# Patient Record
Sex: MALE | URBAN - METROPOLITAN AREA
[De-identification: names, ages, dates, MRNs, and addresses within clinical notes are randomized per-mention and may not be internally consistent; named-entity substitution may affect disease eponyms.]

---

## 2019-10-24 ENCOUNTER — NON-PROVIDER VISIT (OUTPATIENT)
Dept: URGENT CARE | Facility: PHYSICIAN GROUP | Age: 49
End: 2019-10-24

## 2019-10-24 DIAGNOSIS — Z02.83 ENCOUNTER FOR DRUG SCREENING: ICD-10-CM

## 2019-10-24 PROCEDURE — 8907 PR URINE COLLECT ONLY: Performed by: PHYSICIAN ASSISTANT

## 2020-11-02 ENCOUNTER — TELEPHONE (OUTPATIENT)
Dept: SCHEDULING | Facility: IMAGING CENTER | Age: 50
End: 2020-11-02

## 2020-11-03 ENCOUNTER — OFFICE VISIT (OUTPATIENT)
Dept: MEDICAL GROUP | Age: 50
End: 2020-11-03
Payer: COMMERCIAL

## 2020-11-03 VITALS
HEIGHT: 68 IN | HEART RATE: 100 BPM | BODY MASS INDEX: 34.92 KG/M2 | WEIGHT: 230.4 LBS | DIASTOLIC BLOOD PRESSURE: 70 MMHG | TEMPERATURE: 97.7 F | SYSTOLIC BLOOD PRESSURE: 110 MMHG | OXYGEN SATURATION: 97 %

## 2020-11-03 DIAGNOSIS — Z95.5 H/O HEART ARTERY STENT: ICD-10-CM

## 2020-11-03 DIAGNOSIS — Z00.00 ANNUAL PHYSICAL EXAM: ICD-10-CM

## 2020-11-03 DIAGNOSIS — Z00.00 HEALTH CARE MAINTENANCE: ICD-10-CM

## 2020-11-03 DIAGNOSIS — R53.83 FATIGUE, UNSPECIFIED TYPE: ICD-10-CM

## 2020-11-03 DIAGNOSIS — E55.9 HYPOVITAMINOSIS D: ICD-10-CM

## 2020-11-03 DIAGNOSIS — E66.9 OBESITY (BMI 30-39.9): ICD-10-CM

## 2020-11-03 DIAGNOSIS — L40.50 PSORIATIC ARTHRITIS (HCC): ICD-10-CM

## 2020-11-03 DIAGNOSIS — E78.5 DYSLIPIDEMIA: ICD-10-CM

## 2020-11-03 DIAGNOSIS — E11.9 DIABETES MELLITUS TYPE 2, NONINSULIN DEPENDENT (HCC): ICD-10-CM

## 2020-11-03 DIAGNOSIS — I25.10 CORONARY ARTERY DISEASE, NON-OCCLUSIVE: ICD-10-CM

## 2020-11-03 DIAGNOSIS — Z71.85 IMMUNIZATION COUNSELING: ICD-10-CM

## 2020-11-03 DIAGNOSIS — L40.9 PSORIASIS: ICD-10-CM

## 2020-11-03 DIAGNOSIS — I10 ESSENTIAL HYPERTENSION: ICD-10-CM

## 2020-11-03 DIAGNOSIS — G47.9 SLEEP DISORDER: ICD-10-CM

## 2020-11-03 PROCEDURE — 99386 PREV VISIT NEW AGE 40-64: CPT | Performed by: INTERNAL MEDICINE

## 2020-11-03 PROCEDURE — 99214 OFFICE O/P EST MOD 30 MIN: CPT | Mod: 25 | Performed by: INTERNAL MEDICINE

## 2020-11-03 RX ORDER — SECUKINUMAB 150 MG/ML
INJECTION SUBCUTANEOUS
COMMUNITY

## 2020-11-03 RX ORDER — METOPROLOL TARTRATE 50 MG/1
50 TABLET, FILM COATED ORAL 2 TIMES DAILY
Qty: 180 TAB | Refills: 0 | Status: SHIPPED | OUTPATIENT
Start: 2020-11-03 | End: 2020-12-16 | Stop reason: SDUPTHER

## 2020-11-03 RX ORDER — METOPROLOL TARTRATE 50 MG/1
50 TABLET, FILM COATED ORAL 2 TIMES DAILY
COMMUNITY
End: 2020-11-03 | Stop reason: SDUPTHER

## 2020-11-03 RX ORDER — SIMVASTATIN 80 MG
80 TABLET ORAL NIGHTLY
COMMUNITY
End: 2020-12-16

## 2020-11-03 RX ORDER — PHENOL 1.4 %
AEROSOL, SPRAY (ML) MUCOUS MEMBRANE
COMMUNITY

## 2020-11-03 SDOH — HEALTH STABILITY: MENTAL HEALTH: HOW OFTEN DO YOU HAVE A DRINK CONTAINING ALCOHOL?: NEVER

## 2020-11-03 ASSESSMENT — ANXIETY QUESTIONNAIRES
7. FEELING AFRAID AS IF SOMETHING AWFUL MIGHT HAPPEN: NOT AT ALL
3. WORRYING TOO MUCH ABOUT DIFFERENT THINGS: NOT AT ALL
6. BECOMING EASILY ANNOYED OR IRRITABLE: NOT AT ALL
2. NOT BEING ABLE TO STOP OR CONTROL WORRYING: NOT AT ALL
1. FEELING NERVOUS, ANXIOUS, OR ON EDGE: NOT AT ALL
GAD7 TOTAL SCORE: 0
5. BEING SO RESTLESS THAT IT IS HARD TO SIT STILL: NOT AT ALL
4. TROUBLE RELAXING: NOT AT ALL

## 2020-11-03 ASSESSMENT — PATIENT HEALTH QUESTIONNAIRE - PHQ9: CLINICAL INTERPRETATION OF PHQ2 SCORE: 0

## 2020-11-03 NOTE — PROGRESS NOTES
CHIEF COMPLAINT  Chief Complaint   Patient presents with   • Establish Care   • Medication Refill     HPI  Shadi Bentley is a 50 y.o. male who presents today for the following     HCM  Recommendations/advised:  Regular exercise at least 4 days a week  Diet: advised balanced   Dental exam at least 1-2 times per year  Sunscreen use.     Immunizations:  TdaP:  UTD  Influenza: Advised  Pneumonia: Advised    DM2  D  No meds  The last A1c: 7.1 in 2020  No polydipsia, polyphagia, polyuria.  No abdominal pain, weight loss, fatigue.  Diet/exercise/BMI: As above  FH of DM: father    Hypertension, CAD, Stented coronary artery  Meds: Metoprolol, 50 mg twice daily, ASA 81 mg QD, taking as prescribed.   Denies:  -  headaches, vision problems, tinnitus.                 -  chest pain/pressure, palpitations, irregular heart beats, exertional, dyspnea, peripheral edema.      - medication side effect: unusual fatigue, slow heartbeat, foot/leg swelling, cough.  Low salt diet: Advised  Diet: Advised low-calorie advised low calorie Advised low radha  Exercise: advised min 4 d/w  BMI: Body mass index is 35.33 kg/m².  FH of HTN: father    Stress echocardiography, 2019  Terminated due to dyspnea, with predicted heart rate achieved    Dyslipidemia  The patient is on simvastatin 20 mg, taking daily, as prescribed. No myalgias, muscle cramps or pain.   Diet /Exercise/BMI:  As above  FH: father     Hypovitaminosis D, Fatigue  The patient had low vitamin D level.  C/o fatigue.  Vitamin D supplement: OTC.    Psoriasis / arthritis  Dg:  Psoriasis: 11 y/o  Arthritis: late 32, worsening   - knees, ankles, shoulders  No current pain.  Tx: Secukinumab, 300 MG Dose, (COSENTYX, 300 MG DOSE,) 150 MG/ML   F/u by dermatology.    Insomnia  The patient has have a tablet to go and stay asleep, used melatonin OTC, that helped.  Discussed sleep hygiene:   - Go to bed and wake up at consistent times whether work/school day or not.   - Keep room dark,  quiet, and comfortable.   - Don't have naps.  - Increase exposure to sunlight during awake times and avoid bright lights before going to sleep.   - Avoid stimulant or caffeine use more than 4 hours after wake time.   - Avoid meal or exercise 2-3 hours prior to going to bed.    Obesity, Body mass index is 35.33 kg/m².  Onset: since his 20'  Diet: regular  Exercise: insufficient  No temperature intolerance. No change in hair/skin quality, BMs.   No HTN, buffalo hump, purple striae, flushing.  FH of obesity: father, brother    Reviewed PMH, PSH, FH, SH, ALL, HCM/IMM  Reviewed MEDS    CURRENT MEDICATIONS  Current Outpatient Medications   Medication Sig Dispense Refill   • ASPIRIN 81 PO Take  by mouth.     • Melatonin 10 MG Tab Take  by mouth.     • simvastatin (ZOCOR) 80 MG tablet Take 80 mg by mouth every evening.     • Secukinumab, 300 MG Dose, (COSENTYX, 300 MG DOSE,) 150 MG/ML Solution Prefilled Syringe Inject  as instructed. 1 time a month     • metoprolol (LOPRESSOR) 50 MG Tab Take 1 Tab by mouth 2 times a day. 180 Tab 0     No current facility-administered medications for this visit.      ALLERGIES  Allergies: Lisinopril, Advil [ibuprofen], and Morphine  PAST MEDICAL HISTORY  Past Medical History:   Diagnosis Date   • CAD (coronary artery disease)    • Diabetes (HCC)    • Hyperlipidemia    • Psoriasis      SURGICAL HISTORY  He  has no past surgical history on file.  SOCIAL HISTORY  Social History     Tobacco Use   • Smoking status: Former Smoker     Packs/day: 1.00     Years: 30.00     Pack years: 30.00     Types: Cigarettes   • Smokeless tobacco: Never Used   • Tobacco comment: quit year and a half ago   Substance Use Topics   • Alcohol use: Not Currently     Frequency: Never   • Drug use: Never     Social History     Social History Narrative   • Not on file     FAMILY HISTORY  Family History   Problem Relation Age of Onset   • Diabetes Father    • Heart Disease Father    • Hypertension Father    •  "Hyperlipidemia Father      Family Status   Relation Name Status   • Fa  (Not Specified)     ROS   Constitutional: Negative for fever, chills, fatigue.  HENT: Negative for congestion, sore throat.  Eyes: Negative for vision problems.   Respiratory: Negative for cough, shortness of breath.  Cardiovascular: Negative for chest pain, palpitations.   Gastrointestinal: Negative for heartburn, nausea, abdominal pain.   Genitourinary: Negative for dysuria.  Musculoskeletal: Negative for significant myalgia, back and joint pain.   Skin: Negative for rash.   Neuro: Negative for dizziness, weakness and headaches.   Endo/Heme/Allergies: Does not bruise/bleed easily.   Psychiatric/Behavioral: Negative for depression.    PHYSICAL EXAM   Blood Pressure 110/70 (BP Location: Left arm, Patient Position: Sitting, BP Cuff Size: Adult)   Pulse 100   Temperature 36.5 °C (97.7 °F) (Temporal)   Height 1.72 m (5' 7.72\")   Weight 104.5 kg (230 lb 6.4 oz)   Oxygen Saturation 97%  Body mass index is 35.33 kg/m².  General:  NAD, well appearing  HEENT:   NC/AT, PERRLA, EOMI.  Cardiovascular: unlabored breathing, no peripheral cyanosis or swelling.     Lungs:   no respiratory distress.  Abdomen: non- distended.  Extremities:  No LE swelling.  Skin:  Warm, dry.  No erythema. No rash.   Neurologic: Alert & oriented x 3. CN II-XII grossly intact. No focal deficits.  Psychiatric:  Affect normal, mood normal, judgment normal.    Labs     Labs are reviewed and discussed with a patient, care everywhere    A1c 6.7, 9[birth year]28/1958  CMP, wnl: 5/10/2016  Lipid panel, wnl:  5/10/2016     Imaging     Reviewed and discussed per HPI    Assessment and Plan     Shadi Bentley is a 50 y.o. male    1. Annual physical exam  Reviewed PMH, PSH, FH, SH, ALL, MEDS, HCM/IMM.   Advised healthy habits, diet, exercise.    2. Health care maintenance  Per HPI    3. Immunization counseling  Declined flu vaccine    4. Diabetes mellitus type 2, noninsulin dependent (HCC)  Pending " labs, no meds; advise per hPI  - HEMOGLOBIN A1C; Future  - MICROALBUMIN CREAT RATIO URINE; Future  - Comp Metabolic Panel; Future    5. Essential hypertension  Controlled, continue with current treatment, cardiology f/u  - Comp Metabolic Panel; Future  6. Coronary artery disease, non-occlusive  - REFERRAL TO CARDIOLOGY  7. H/O heart artery stent*  - REFERRAL TO CARDIOLOGY    8. Dyslipidemia  Pending labs, continue current treatment  - Comp Metabolic Panel; Future  - Lipid Profile; Future    9. Hypovitaminosis D  Pending labs, continue current supplement  - VITAMIN D,25 HYDROXY; Future    10. Fatigue, unspecified type  Pending labs  - CBC WITH DIFFERENTIAL; Future  - TSH WITH REFLEX TO FT4; Future    11. Psoriasis  Stable, continue current treatment, dermatology follow-up  - REFERRAL TO DERMATOLOGY  12. Psoriatic arthritis (HCC)  - REFERRAL TO DERMATOLOGY  - CBC WITH DIFFERENTIAL; Future    13. Sleep disorder  Controlled, continue current treatment    14. Obesity (BMI 30-39.9)  - Patient identified as having weight management issue.  Appropriate orders and counseling given.    Counseling:   - Smoking:  Nonsmoker    Followup: Within 1 month, labs/DM    All questions are answered.    Please note that this dictation was created using voice recognition software, and that there might be errors of solange and possibly content.

## 2020-12-05 ENCOUNTER — HOSPITAL ENCOUNTER (OUTPATIENT)
Dept: LAB | Facility: MEDICAL CENTER | Age: 50
End: 2020-12-05
Attending: INTERNAL MEDICINE
Payer: COMMERCIAL

## 2020-12-05 DIAGNOSIS — E78.5 DYSLIPIDEMIA: ICD-10-CM

## 2020-12-05 DIAGNOSIS — I10 ESSENTIAL HYPERTENSION: ICD-10-CM

## 2020-12-05 DIAGNOSIS — E11.9 DIABETES MELLITUS TYPE 2, NONINSULIN DEPENDENT (HCC): ICD-10-CM

## 2020-12-05 DIAGNOSIS — E55.9 HYPOVITAMINOSIS D: ICD-10-CM

## 2020-12-05 DIAGNOSIS — L40.50 PSORIATIC ARTHRITIS (HCC): ICD-10-CM

## 2020-12-05 DIAGNOSIS — R53.83 FATIGUE, UNSPECIFIED TYPE: ICD-10-CM

## 2020-12-05 LAB
25(OH)D3 SERPL-MCNC: 28 NG/ML (ref 30–100)
ALBUMIN SERPL BCP-MCNC: 4.5 G/DL (ref 3.2–4.9)
ALBUMIN/GLOB SERPL: 1.6 G/DL
ALP SERPL-CCNC: 93 U/L (ref 30–99)
ALT SERPL-CCNC: 33 U/L (ref 2–50)
ANION GAP SERPL CALC-SCNC: 10 MMOL/L (ref 7–16)
AST SERPL-CCNC: 19 U/L (ref 12–45)
BASOPHILS # BLD AUTO: 0.6 % (ref 0–1.8)
BASOPHILS # BLD: 0.07 K/UL (ref 0–0.12)
BILIRUB SERPL-MCNC: 0.4 MG/DL (ref 0.1–1.5)
BUN SERPL-MCNC: 13 MG/DL (ref 8–22)
CALCIUM SERPL-MCNC: 9.6 MG/DL (ref 8.5–10.5)
CHLORIDE SERPL-SCNC: 100 MMOL/L (ref 96–112)
CHOLEST SERPL-MCNC: 166 MG/DL (ref 100–199)
CO2 SERPL-SCNC: 24 MMOL/L (ref 20–33)
CREAT SERPL-MCNC: 0.8 MG/DL (ref 0.5–1.4)
CREAT UR-MCNC: 81.42 MG/DL
EOSINOPHIL # BLD AUTO: 0.34 K/UL (ref 0–0.51)
EOSINOPHIL NFR BLD: 3.2 % (ref 0–6.9)
ERYTHROCYTE [DISTWIDTH] IN BLOOD BY AUTOMATED COUNT: 40.6 FL (ref 35.9–50)
EST. AVERAGE GLUCOSE BLD GHB EST-MCNC: 197 MG/DL
FASTING STATUS PATIENT QL REPORTED: NORMAL
GLOBULIN SER CALC-MCNC: 2.9 G/DL (ref 1.9–3.5)
GLUCOSE SERPL-MCNC: 221 MG/DL (ref 65–99)
HBA1C MFR BLD: 8.5 % (ref 0–5.6)
HCT VFR BLD AUTO: 48.1 % (ref 42–52)
HDLC SERPL-MCNC: 40 MG/DL
HGB BLD-MCNC: 16.7 G/DL (ref 14–18)
IMM GRANULOCYTES # BLD AUTO: 0.06 K/UL (ref 0–0.11)
IMM GRANULOCYTES NFR BLD AUTO: 0.6 % (ref 0–0.9)
LDLC SERPL CALC-MCNC: 98 MG/DL
LYMPHOCYTES # BLD AUTO: 2.53 K/UL (ref 1–4.8)
LYMPHOCYTES NFR BLD: 23.5 % (ref 22–41)
MCH RBC QN AUTO: 32.6 PG (ref 27–33)
MCHC RBC AUTO-ENTMCNC: 34.7 G/DL (ref 33.7–35.3)
MCV RBC AUTO: 93.9 FL (ref 81.4–97.8)
MICROALBUMIN UR-MCNC: 2.1 MG/DL
MICROALBUMIN/CREAT UR: 26 MG/G (ref 0–30)
MONOCYTES # BLD AUTO: 0.77 K/UL (ref 0–0.85)
MONOCYTES NFR BLD AUTO: 7.1 % (ref 0–13.4)
NEUTROPHILS # BLD AUTO: 7.01 K/UL (ref 1.82–7.42)
NEUTROPHILS NFR BLD: 65 % (ref 44–72)
NRBC # BLD AUTO: 0 K/UL
NRBC BLD-RTO: 0 /100 WBC
PLATELET # BLD AUTO: 239 K/UL (ref 164–446)
PMV BLD AUTO: 9.2 FL (ref 9–12.9)
POTASSIUM SERPL-SCNC: 4.5 MMOL/L (ref 3.6–5.5)
PROT SERPL-MCNC: 7.4 G/DL (ref 6–8.2)
RBC # BLD AUTO: 5.12 M/UL (ref 4.7–6.1)
SODIUM SERPL-SCNC: 134 MMOL/L (ref 135–145)
TRIGL SERPL-MCNC: 141 MG/DL (ref 0–149)
TSH SERPL DL<=0.005 MIU/L-ACNC: 1.19 UIU/ML (ref 0.38–5.33)
WBC # BLD AUTO: 10.8 K/UL (ref 4.8–10.8)

## 2020-12-05 PROCEDURE — 83036 HEMOGLOBIN GLYCOSYLATED A1C: CPT

## 2020-12-05 PROCEDURE — 80053 COMPREHEN METABOLIC PANEL: CPT

## 2020-12-05 PROCEDURE — 36415 COLL VENOUS BLD VENIPUNCTURE: CPT

## 2020-12-05 PROCEDURE — 80061 LIPID PANEL: CPT

## 2020-12-05 PROCEDURE — 82570 ASSAY OF URINE CREATININE: CPT

## 2020-12-05 PROCEDURE — 82043 UR ALBUMIN QUANTITATIVE: CPT

## 2020-12-05 PROCEDURE — 84443 ASSAY THYROID STIM HORMONE: CPT

## 2020-12-05 PROCEDURE — 85025 COMPLETE CBC W/AUTO DIFF WBC: CPT

## 2020-12-05 PROCEDURE — 82306 VITAMIN D 25 HYDROXY: CPT

## 2020-12-16 ENCOUNTER — APPOINTMENT (RX ONLY)
Dept: URBAN - METROPOLITAN AREA CLINIC 4 | Facility: CLINIC | Age: 50
Setting detail: DERMATOLOGY
End: 2020-12-16

## 2020-12-16 ENCOUNTER — OFFICE VISIT (OUTPATIENT)
Dept: MEDICAL GROUP | Age: 50
End: 2020-12-16
Payer: COMMERCIAL

## 2020-12-16 ENCOUNTER — OFFICE VISIT (OUTPATIENT)
Dept: CARDIOLOGY | Facility: MEDICAL CENTER | Age: 50
End: 2020-12-16
Payer: COMMERCIAL

## 2020-12-16 VITALS
SYSTOLIC BLOOD PRESSURE: 110 MMHG | BODY MASS INDEX: 35.77 KG/M2 | HEIGHT: 68 IN | DIASTOLIC BLOOD PRESSURE: 60 MMHG | WEIGHT: 236 LBS | OXYGEN SATURATION: 97 % | HEART RATE: 76 BPM

## 2020-12-16 VITALS
HEIGHT: 68 IN | WEIGHT: 235.4 LBS | HEART RATE: 76 BPM | DIASTOLIC BLOOD PRESSURE: 52 MMHG | OXYGEN SATURATION: 94 % | TEMPERATURE: 96.8 F | SYSTOLIC BLOOD PRESSURE: 92 MMHG | BODY MASS INDEX: 35.68 KG/M2

## 2020-12-16 DIAGNOSIS — L40.0 PSORIASIS VULGARIS: ICD-10-CM | Status: INADEQUATELY CONTROLLED

## 2020-12-16 DIAGNOSIS — I25.10 CORONARY ARTERY DISEASE INVOLVING NATIVE CORONARY ARTERY OF NATIVE HEART WITHOUT ANGINA PECTORIS: ICD-10-CM

## 2020-12-16 DIAGNOSIS — Z95.5 H/O HEART ARTERY STENT: ICD-10-CM

## 2020-12-16 DIAGNOSIS — L40.59 OTHER PSORIATIC ARTHROPATHY: ICD-10-CM

## 2020-12-16 DIAGNOSIS — I10 ESSENTIAL HYPERTENSION: ICD-10-CM

## 2020-12-16 DIAGNOSIS — E78.5 DYSLIPIDEMIA: ICD-10-CM

## 2020-12-16 DIAGNOSIS — E55.9 HYPOVITAMINOSIS D: ICD-10-CM

## 2020-12-16 DIAGNOSIS — E11.9 DIABETES MELLITUS TYPE 2, NONINSULIN DEPENDENT (HCC): ICD-10-CM

## 2020-12-16 DIAGNOSIS — I25.10 CORONARY ARTERY DISEASE, NON-OCCLUSIVE: ICD-10-CM

## 2020-12-16 PROBLEM — G47.33 OSA ON CPAP: Status: ACTIVE | Noted: 2020-12-16

## 2020-12-16 LAB — EKG IMPRESSION: NORMAL

## 2020-12-16 PROCEDURE — 99204 OFFICE O/P NEW MOD 45 MIN: CPT | Performed by: INTERNAL MEDICINE

## 2020-12-16 PROCEDURE — ? ORDER TESTS

## 2020-12-16 PROCEDURE — ? ADDITIONAL NOTES

## 2020-12-16 PROCEDURE — 93000 ELECTROCARDIOGRAM COMPLETE: CPT | Performed by: INTERNAL MEDICINE

## 2020-12-16 PROCEDURE — ? MEDICATION COUNSELING

## 2020-12-16 PROCEDURE — 99203 OFFICE O/P NEW LOW 30 MIN: CPT

## 2020-12-16 PROCEDURE — 99214 OFFICE O/P EST MOD 30 MIN: CPT | Performed by: INTERNAL MEDICINE

## 2020-12-16 PROCEDURE — ? COUNSELING

## 2020-12-16 PROCEDURE — ? PRESCRIPTION

## 2020-12-16 PROCEDURE — ? TALTZ INITIATION

## 2020-12-16 RX ORDER — HYDROCORTISONE 25 MG/G
CREAM TOPICAL
Qty: 1 | Refills: 2 | Status: ERX | COMMUNITY
Start: 2020-12-16

## 2020-12-16 RX ORDER — FLUOCINONIDE 0.5 MG/ML
SOLUTION TOPICAL
Qty: 1 | Refills: 1 | Status: ERX | COMMUNITY
Start: 2020-12-16

## 2020-12-16 RX ORDER — METFORMIN HYDROCHLORIDE 500 MG/1
1000 TABLET, EXTENDED RELEASE ORAL 2 TIMES DAILY
Qty: 360 TAB | Refills: 0 | Status: SHIPPED | OUTPATIENT
Start: 2020-12-16

## 2020-12-16 RX ORDER — METOPROLOL TARTRATE 50 MG/1
50 TABLET, FILM COATED ORAL 2 TIMES DAILY
Qty: 180 TAB | Refills: 3 | Status: SHIPPED | OUTPATIENT
Start: 2020-12-16

## 2020-12-16 RX ORDER — NITROGLYCERIN 0.4 MG/1
0.4 TABLET SUBLINGUAL PRN
Qty: 25 TAB | Refills: 3 | Status: SHIPPED | OUTPATIENT
Start: 2020-12-16

## 2020-12-16 RX ORDER — ROSUVASTATIN CALCIUM 20 MG/1
20 TABLET, COATED ORAL EVERY EVENING
Qty: 90 TAB | Refills: 3 | Status: SHIPPED | OUTPATIENT
Start: 2020-12-16

## 2020-12-16 RX ADMIN — HYDROCORTISONE: 25 CREAM TOPICAL at 00:00

## 2020-12-16 RX ADMIN — FLUOCINONIDE: 0.5 SOLUTION TOPICAL at 00:00

## 2020-12-16 ASSESSMENT — LOCATION SIMPLE DESCRIPTION DERM
LOCATION SIMPLE: RIGHT KNEE
LOCATION SIMPLE: LEFT KNEE
LOCATION SIMPLE: RIGHT EAR
LOCATION SIMPLE: LEFT HAND
LOCATION SIMPLE: RIGHT SHOULDER
LOCATION SIMPLE: LEFT CHEEK
LOCATION SIMPLE: LEFT EAR
LOCATION SIMPLE: LEFT ANKLE
LOCATION SIMPLE: RIGHT HAND
LOCATION SIMPLE: RIGHT ANKLE
LOCATION SIMPLE: LEFT SHOULDER

## 2020-12-16 ASSESSMENT — ENCOUNTER SYMPTOMS
CHILLS: 0
MYALGIAS: 0
ORTHOPNEA: 0
FEVER: 0
PND: 0
PALPITATIONS: 0
BLURRED VISION: 0
DIZZINESS: 0
LOSS OF CONSCIOUSNESS: 0
SHORTNESS OF BREATH: 0
INSOMNIA: 0
ABDOMINAL PAIN: 0

## 2020-12-16 ASSESSMENT — LOCATION DETAILED DESCRIPTION DERM
LOCATION DETAILED: LEFT RADIAL PALM
LOCATION DETAILED: RIGHT SHOULDER JOINT
LOCATION DETAILED: LEFT CENTRAL MALAR CHEEK
LOCATION DETAILED: LEFT ANKLE JOINT
LOCATION DETAILED: LEFT KNEE JOINT
LOCATION DETAILED: LEFT SHOULDER JOINT
LOCATION DETAILED: RIGHT ULNAR PALM
LOCATION DETAILED: RIGHT KNEE JOINT
LOCATION DETAILED: RIGHT ANKLE JOINT
LOCATION DETAILED: RIGHT CRUS OF HELIX
LOCATION DETAILED: LEFT CRUS OF HELIX

## 2020-12-16 ASSESSMENT — LOCATION ZONE DERM
LOCATION ZONE: KNEE
LOCATION ZONE: EAR
LOCATION ZONE: ANKLE
LOCATION ZONE: SHOULDER
LOCATION ZONE: FACE
LOCATION ZONE: HAND

## 2020-12-16 ASSESSMENT — FIBROSIS 4 INDEX
FIB4 SCORE: 0.66
FIB4 SCORE: 0.66

## 2020-12-16 ASSESSMENT — BSA PSORIASIS: % BODY COVERED IN PSORIASIS: 3

## 2020-12-16 NOTE — PROCEDURE: ADDITIONAL NOTES
Additional Notes: I assessed this patient with Dr. Дмитрий Mendez, who gave his recommendation to add topical medications to patient’s existing regimen vs switching biologics. The patient elects to try a new biologic, which, in this case, will be Taltz. Dr. Becerra will follow up with this pt and manage their care accordingly from this point forward.
Detail Level: Detailed

## 2020-12-16 NOTE — PROGRESS NOTES
CHIEF COMPLAINT  Chief Complaint   Patient presents with   • Lab Results   DM    HPI  Shadi Bentley is a 50 y.o. male who presents today for the following     DM2  Interval course  HBA1C 8.5 (H) 2020 07:07 AM   HBA1C 7.1 (A) 2020 10:39 AM   HBA1C 12.0 (A) 2019 01:50 PM     No meds.    D  No meds  The last A1c: 7.1 in 2020  No polydipsia, polyphagia, polyuria.  No abdominal pain, weight loss, fatigue.  Diet/exercise/BMI: As above  FH of DM: father     Hypertension, CAD, Stented coronary artery  Meds: Metoprolol, 50 mg twice daily, ASA 81 mg QD, taking as prescribed.   Denies:  -  headaches, vision problems, tinnitus.                 -  chest pain/pressure, palpitations, irregular heart beats, exertional, dyspnea, peripheral edema.                 - medication side effect: unusual fatigue, slow heartbeat, foot/leg swelling, cough.  Low salt diet: Advised  Diet: Advised low-calorie advised low calorie Advised low radha  Exercise: advised min 4 d/w  BMI: Body mass index is 35  FH of HTN: father     Stress echocardiography, 2019  Terminated due to dyspnea, with predicted heart rate achieved     Dyslipidemia  The patient is on simvastatin 20 mg, taking daily, as prescribed. No myalgias, muscle cramps or pain.   Diet /Exercise/BMI:  As above  FH: father      Hypovitaminosis D  The patient had low vitamin D level.  Vitamin D supplement: no, will start 2000 U QD OTC.    Reviewed PMH, PSH, FH, SH, ALL, HCM/IMM, no changes  Reviewed MEDS, no changes    Patient Active Problem List    Diagnosis Date Noted   • VENESSA on CPAP 2020   • Diabetes mellitus type 2, noninsulin dependent (HCC) 2020   • Essential hypertension 2020   • Coronary artery disease, non-occlusive 2020   • H/O heart artery stent 2020   • Dyslipidemia 2020   • Psoriasis 2020   • Psoriatic arthritis (HCC) 2020   • Sleep disorder 2020   • Obesity (BMI 30-39.9) 2020     CURRENT  MEDICATIONS  Current Outpatient Medications   Medication Sig Dispense Refill   • rosuvastatin (CRESTOR) 20 MG Tab Take 1 Tab by mouth every evening. 90 Tab 3   • metoprolol (LOPRESSOR) 50 MG Tab Take 1 Tab by mouth 2 times a day. 180 Tab 3   • nitroglycerin (NITROSTAT) 0.4 MG SL Tab Place 1 Tab under the tongue as needed for Chest Pain. 25 Tab 3   • ASPIRIN 81 PO Take  by mouth.     • Melatonin 10 MG Tab Take  by mouth.     • Secukinumab, 300 MG Dose, (COSENTYX, 300 MG DOSE,) 150 MG/ML Solution Prefilled Syringe Inject  as instructed. 1 time a month       No current facility-administered medications for this visit.      ALLERGIES  Allergies: Lisinopril, Advil [ibuprofen], and Morphine  PAST MEDICAL HISTORY  Past Medical History:   Diagnosis Date   • CAD (coronary artery disease)    • Diabetes (HCC)    • Hyperlipidemia    • Psoriasis      SURGICAL HISTORY  He  has no past surgical history on file.  SOCIAL HISTORY  Social History     Tobacco Use   • Smoking status: Former Smoker     Packs/day: 1.00     Years: 30.00     Pack years: 30.00     Types: Cigarettes   • Smokeless tobacco: Never Used   • Tobacco comment: quit year and a half ago   Substance Use Topics   • Alcohol use: Not Currently     Frequency: Never   • Drug use: Never     Social History     Social History Narrative   • Not on file     FAMILY HISTORY  Family History   Problem Relation Age of Onset   • Diabetes Father    • Heart Disease Father    • Hypertension Father    • Hyperlipidemia Father      Family Status   Relation Name Status   • Fa  (Not Specified)     ROS   Constitutional: Negative for fever, chills, fatigue.  HENT: Negative for congestion, sore throat.  Eyes: Negative for vision problems.   Respiratory: Negative for cough, shortness of breath.  Cardiovascular: Negative for chest pain, palpitations.   Gastrointestinal: Negative for heartburn, nausea, abdominal pain.   Genitourinary: Negative for dysuria.  Musculoskeletal: Negative for significant  "myalgia, back and joint pain.   Skin: Negative for rash.   Neuro: Negative for dizziness, weakness and headaches.   Endo/Heme/Allergies: Does not bruise/bleed easily.   Psychiatric/Behavioral: Negative for depression.    PHYSICAL EXAM   Blood Pressure (Abnormal) 92/52 (BP Location: Left arm, Patient Position: Sitting, BP Cuff Size: Adult)   Pulse 76   Temperature 36 °C (96.8 °F) (Temporal)   Height 1.727 m (5' 8\")   Weight 106.8 kg (235 lb 6.4 oz)   Oxygen Saturation 94%  Body mass index is 35.79 kg/m².  General:  NAD, well appearing  HEENT:   NC/AT, PERRLA, EOMI.  Cardiovascular: unlabored breathing, no peripheral cyanosis or swelling.     Lungs:   no respiratory distress.  Abdomen: non- distended.  Extremities:  No LE swelling.  Skin:  Warm, dry.  No erythema. No rash.   Neurologic: Alert & oriented x 3. CN II-XII grossly intact. No focal deficits.  Psychiatric:  Affect normal, mood normal, judgment normal.    Labs     Labs are reviewed and discussed with a patient  Lab Results   Component Value Date/Time    CHOLSTRLTOT 166 12/05/2020 07:07 AM    LDL 98 12/05/2020 07:07 AM    HDL 40 12/05/2020 07:07 AM    TRIGLYCERIDE 141 12/05/2020 07:07 AM       Lab Results   Component Value Date/Time    SODIUM 134 (L) 12/05/2020 07:07 AM    POTASSIUM 4.5 12/05/2020 07:07 AM    CHLORIDE 100 12/05/2020 07:07 AM    CO2 24 12/05/2020 07:07 AM    GLUCOSE 221 (H) 12/05/2020 07:07 AM    BUN 13 12/05/2020 07:07 AM    CREATININE 0.80 12/05/2020 07:07 AM     Lab Results   Component Value Date/Time    ALKPHOSPHAT 93 12/05/2020 07:07 AM    ASTSGOT 19 12/05/2020 07:07 AM    ALTSGPT 33 12/05/2020 07:07 AM    TBILIRUBIN 0.4 12/05/2020 07:07 AM      Lab Results   Component Value Date/Time    HBA1C 8.5 (H) 12/05/2020 07:07 AM    HBA1C 7.1 (A) 06/22/2020 10:39 AM    HBA1C 12.0 (A) 11/30/2019 01:50 PM     Lab Results   Component Value Date/Time    WBC 10.8 12/05/2020 07:07 AM    RBC 5.12 12/05/2020 07:07 AM    HEMOGLOBIN 16.7 12/05/2020 " 07:07 AM    HEMATOCRIT 48.1 12/05/2020 07:07 AM    MCV 93.9 12/05/2020 07:07 AM    MCH 32.6 12/05/2020 07:07 AM    MCHC 34.7 12/05/2020 07:07 AM    MPV 9.2 12/05/2020 07:07 AM    NEUTSPOLYS 65.00 12/05/2020 07:07 AM    LYMPHOCYTES 23.50 12/05/2020 07:07 AM    MONOCYTES 7.10 12/05/2020 07:07 AM    EOSINOPHILS 3.20 12/05/2020 07:07 AM    BASOPHILS 0.60 12/05/2020 07:07 AM      Imaging     Reviewed and discussed per HPI    Assessment and Plan     Shadi Bentley is a 50 y.o. male    1. Diabetes mellitus type 2, noninsulin dependent (HCC)  Advised:  -Low-calorie diet, daily exercise, weight loss  -Daily inspection  -Start Metformin 1 tablet twice daily for 1 week and then 2 tablets twice daily  - metFORMIN ER (GLUCOPHAGE XR) 500 MG TABLET SR 24 HR; Take 2 Tabs by mouth 2 times a day.  Dispense: 360 Tab; Refill: 0  - Comp Metabolic Panel; Future  - HEMOGLOBIN A1C; Future  - REFERRAL TO OPHTHALMOLOGY    2. Essential hypertension  Controlled, continue with current treatment, cardiology follow-up  - Comp Metabolic Panel; Future  - REFERRAL TO OPHTHALMOLOGY  3. Coronary artery disease, non-occlusive  4. H/O heart artery stent  As above    5. Dyslipidemia  Controlled, continue with current treatment.  - Comp Metabolic Panel; Future    6. Hypovitaminosis D  Advised to start 2000 units of vitamin D daily, OTC  - VITAMIN D,25 HYDROXY; Future    Counseling:   - Smoking:  Nonsmoker    Followup: In 3 months. DM RN    All questions are answered.    Please note that this dictation was created using voice recognition software, and that there might be errors of solange and possibly content.

## 2020-12-16 NOTE — PROCEDURE: ORDER TESTS
Billing Type: Third-Party Bill
Expected Date Of Service: 12/16/2020
Performing Laboratory: [id number]
Bill For Surgical Tray: no

## 2020-12-16 NOTE — PROCEDURE: MEDICATION COUNSELING
Cosentyx Pregnancy And Lactation Text: This medication is Pregnancy Category B and is considered safe during pregnancy. It is unknown if this medication is excreted in breast milk.
Simponi Pregnancy And Lactation Text: The risk during pregnancy and breastfeeding is uncertain with this medication.
Nsaids Pregnancy And Lactation Text: These medications are considered safe up to 30 weeks gestation. It is excreted in breast milk.
Fluconazole Counseling:  Patient counseled regarding adverse effects of fluconazole including but not limited to headache, diarrhea, nausea, upset stomach, liver function test abnormalities, taste disturbance, and stomach pain.  There is a rare possibility of liver failure that can occur when taking fluconazole.  The patient understands that monitoring of LFTs and kidney function test may be required, especially at baseline. The patient verbalized understanding of the proper use and possible adverse effects of fluconazole.  All of the patient's questions and concerns were addressed.
Itraconazole Counseling:  I discussed with the patient the risks of itraconazole including but not limited to liver damage, nausea/vomiting, neuropathy, and severe allergy.  The patient understands that this medication is best absorbed when taken with acidic beverages such as non-diet cola or ginger ale.  The patient understands that monitoring is required including baseline LFTs and repeat LFTs at intervals.  The patient understands that they are to contact us or the primary physician if concerning signs are noted.
Mirvaso Counseling: Mirvaso is a topical medication which can decrease superficial blood flow where applied. Side effects are uncommon and include stinging, redness and allergic reactions.
Topical Sulfur Applications Counseling: Topical Sulfur Counseling: Patient counseled that this medication may cause skin irritation or allergic reactions.  In the event of skin irritation, the patient was advised to reduce the amount of the drug applied or use it less frequently.   The patient verbalized understanding of the proper use and possible adverse effects of topical sulfur application.  All of the patient's questions and concerns were addressed.
Acitretin Pregnancy And Lactation Text: This medication is Pregnancy Category X and should not be given to women who are pregnant or may become pregnant in the future. This medication is excreted in breast milk.
Acitretin Counseling:  I discussed with the patient the risks of acitretin including but not limited to hair loss, dry lips/skin/eyes, liver damage, hyperlipidemia, depression/suicidal ideation, photosensitivity.  Serious rare side effects can include but are not limited to pancreatitis, pseudotumor cerebri, bony changes, clot formation/stroke/heart attack.  Patient understands that alcohol is contraindicated since it can result in liver toxicity and significantly prolong the elimination of the drug by many years.
Calcipotriene Counseling:  I discussed with the patient the risks of calcipotriene including but not limited to erythema, scaling, itching, and irritation.
Erythromycin Counseling:  I discussed with the patient the risks of erythromycin including but not limited to GI upset, allergic reaction, drug rash, diarrhea, increase in liver enzymes, and yeast infections.
Ivermectin Counseling:  Patient instructed to take medication on an empty stomach with a full glass of water.  Patient informed of potential adverse effects including but not limited to nausea, diarrhea, dizziness, itching, and swelling of the extremities or lymph nodes.  The patient verbalized understanding of the proper use and possible adverse effects of ivermectin.  All of the patient's questions and concerns were addressed.
Thalidomide Counseling: I discussed with the patient the risks of thalidomide including but not limited to birth defects, anxiety, weakness, chest pain, dizziness, cough and severe allergy.
Topical Sulfur Applications Pregnancy And Lactation Text: This medication is Pregnancy Category C and has an unknown safety profile during pregnancy. It is unknown if this topical medication is excreted in breast milk.
Skyrizi Counseling: I discussed with the patient the risks of risankizumab-rzaa including but not limited to immunosuppression, and serious infections.  The patient understands that monitoring is required including a PPD at baseline and must alert us or the primary physician if symptoms of infection or other concerning signs are noted.
Mirvaso Pregnancy And Lactation Text: This medication has not been assigned a Pregnancy Risk Category. It is unknown if the medication is excreted in breast milk.
Odomzo Counseling- I discussed with the patient the risks of Odomzo including but not limited to nausea, vomiting, diarrhea, constipation, weight loss, changes in the sense of taste, decreased appetite, muscle spasms, and hair loss.  The patient verbalized understanding of the proper use and possible adverse effects of Odomzo.  All of the patient's questions and concerns were addressed.
Dapsone Pregnancy And Lactation Text: This medication is Pregnancy Category C and is not considered safe during pregnancy or breast feeding.
Dapsone Counseling: I discussed with the patient the risks of dapsone including but not limited to hemolytic anemia, agranulocytosis, rashes, methemoglobinemia, kidney failure, peripheral neuropathy, headaches, GI upset, and liver toxicity.  Patients who start dapsone require monitoring including baseline LFTs and weekly CBCs for the first month, then every month thereafter.  The patient verbalized understanding of the proper use and possible adverse effects of dapsone.  All of the patient's questions and concerns were addressed.
Thalidomide Pregnancy And Lactation Text: This medication is Pregnancy Category X and is absolutely contraindicated during pregnancy. It is unknown if it is excreted in breast milk.
Dupixent Counseling: I discussed with the patient the risks of dupilumab including but not limited to eye infection and irritation, cold sores, injection site reactions, worsening of asthma, allergic reactions and increased risk of parasitic infection.  Live vaccines should be avoided while taking dupilumab. Dupilumab will also interact with certain medications such as warfarin and cyclosporine. The patient understands that monitoring is required and they must alert us or the primary physician if symptoms of infection or other concerning signs are noted.
Calcipotriene Pregnancy And Lactation Text: This medication has not been proven safe during pregnancy. It is unknown if this medication is excreted in breast milk.
Fluconazole Pregnancy And Lactation Text: This medication is Pregnancy Category C and it isn't know if it is safe during pregnancy. It is also excreted in breast milk.
Ivermectin Pregnancy And Lactation Text: This medication is Pregnancy Category C and it isn't known if it is safe during pregnancy. It is also excreted in breast milk.
Erythromycin Pregnancy And Lactation Text: This medication is Pregnancy Category B and is considered safe during pregnancy. It is also excreted in breast milk.
Azathioprine Counseling:  I discussed with the patient the risks of azathioprine including but not limited to myelosuppression, immunosuppression, hepatotoxicity, lymphoma, and infections.  The patient understands that monitoring is required including baseline LFTs, Creatinine, possible TPMP genotyping and weekly CBCs for the first month and then every 2 weeks thereafter.  The patient verbalized understanding of the proper use and possible adverse effects of azathioprine.  All of the patient's questions and concerns were addressed.
Griseofulvin Counseling:  I discussed with the patient the risks of griseofulvin including but not limited to photosensitivity, cytopenia, liver damage, nausea/vomiting and severe allergy.  The patient understands that this medication is best absorbed when taken with a fatty meal (e.g., ice cream or french fries).
Dupixent Pregnancy And Lactation Text: This medication likely crosses the placenta but the risk for the fetus is uncertain. This medication is excreted in breast milk.
Metronidazole Counseling:  I discussed with the patient the risks of metronidazole including but not limited to seizures, nausea/vomiting, a metallic taste in the mouth, nausea/vomiting and severe allergy.
Ketoconazole Counseling:   Patient counseled regarding improving absorption with orange juice.  Adverse effects include but are not limited to breast enlargement, headache, diarrhea, nausea, upset stomach, liver function test abnormalities, taste disturbance, and stomach pain.  There is a rare possibility of liver failure that can occur when taking ketoconazole. The patient understands that monitoring of LFTs may be required, especially at baseline. The patient verbalized understanding of the proper use and possible adverse effects of ketoconazole.  All of the patient's questions and concerns were addressed.
Picato Counseling:  I discussed with the patient the risks of Picato including but not limited to erythema, scaling, itching, weeping, crusting, and pain.
Wartpeel Counseling:  I discussed with the patient the risks of Wartpeel including but not limited to erythema, scaling, itching, weeping, crusting, and pain.
Bexarotene Pregnancy And Lactation Text: This medication is Pregnancy Category X and should not be given to women who are pregnant or may become pregnant. This medication should not be used if you are breast feeding.
5-Fu Counseling: 5-Fluorouracil Counseling:  I discussed with the patient the risks of 5-fluorouracil including but not limited to erythema, scaling, itching, weeping, crusting, and pain.
Bexarotene Counseling:  I discussed with the patient the risks of bexarotene including but not limited to hair loss, dry lips/skin/eyes, liver abnormalities, hyperlipidemia, pancreatitis, depression/suicidal ideation, photosensitivity, drug rash/allergic reactions, hypothyroidism, anemia, leukopenia, infection, cataracts, and teratogenicity.  Patient understands that they will need regular blood tests to check lipid profile, liver function tests, white blood cell count, thyroid function tests and pregnancy test if applicable.
Detail Level: Generalized
Tranexamic Acid Counseling:  Patient advised of the small risk of bleeding problems with tranexamic acid. They were also instructed to call if they developed any nausea, vomiting or diarrhea. All of the patient's questions and concerns were addressed.
Metronidazole Pregnancy And Lactation Text: This medication is Pregnancy Category B and considered safe during pregnancy.  It is also excreted in breast milk.
Ketoconazole Pregnancy And Lactation Text: This medication is Pregnancy Category C and it isn't know if it is safe during pregnancy. It is also excreted in breast milk and breast feeding isn't recommended.
Picato Pregnancy And Lactation Text: This medication is Pregnancy Category C. It is unknown if this medication is excreted in breast milk.
Wartpeel Pregnancy And Lactation Text: This medication is Pregnancy Category X and contraindicated in pregnancy and in women who may become pregnant. It is unknown if this medication is excreted in breast milk.
Azathioprine Pregnancy And Lactation Text: This medication is Pregnancy Category D and isn't considered safe during pregnancy. It is unknown if this medication is excreted in breast milk.
Otezla Counseling: The side effects of Otezla were discussed with the patient, including but not limited to worsening or new depression, weight loss, diarrhea, nausea, upper respiratory tract infection, and headache. Patient instructed to call the office should any adverse effect occur.  The patient verbalized understanding of the proper use and possible adverse effects of Otezla.  All the patient's questions and concerns were addressed.
Tranexamic Acid Pregnancy And Lactation Text: It is unknown if this medication is safe during pregnancy or breast feeding.
Erivedge Counseling- I discussed with the patient the risks of Erivedge including but not limited to nausea, vomiting, diarrhea, constipation, weight loss, changes in the sense of taste, decreased appetite, muscle spasms, and hair loss.  The patient verbalized understanding of the proper use and possible adverse effects of Erivedge.  All of the patient's questions and concerns were addressed.
Enbrel Counseling:  I discussed with the patient the risks of etanercept including but not limited to myelosuppression, immunosuppression, autoimmune hepatitis, demyelinating diseases, lymphoma, and infections.  The patient understands that monitoring is required including a PPD at baseline and must alert us or the primary physician if symptoms of infection or other concerning signs are noted.
Griseofulvin Pregnancy And Lactation Text: This medication is Pregnancy Category X and is known to cause serious birth defects. It is unknown if this medication is excreted in breast milk but breast feeding should be avoided.
Stelara Counseling:  I discussed with the patient the risks of ustekinumab including but not limited to immunosuppression, malignancy, posterior leukoencephalopathy syndrome, and serious infections.  The patient understands that monitoring is required including a PPD at baseline and must alert us or the primary physician if symptoms of infection or other concerning signs are noted.
Drysol Counseling:  I discussed with the patient the risks of drysol/aluminum chloride including but not limited to skin rash, itching, irritation, burning.
Include Pregnancy/Lactation Warning?: No
Cellcept Counseling:  I discussed with the patient the risks of mycophenolate mofetil including but not limited to infection/immunosuppression, GI upset, hypokalemia, hypercholesterolemia, bone marrow suppression, lymphoproliferative disorders, malignancy, GI ulceration/bleed/perforation, colitis, interstitial lung disease, kidney failure, progressive multifocal leukoencephalopathy, and birth defects.  The patient understands that monitoring is required including a baseline creatinine and regular CBC testing. In addition, patient must alert us immediately if symptoms of infection or other concerning signs are noted.
Minocycline Counseling: Patient advised regarding possible photosensitivity and discoloration of the teeth, skin, lips, tongue and gums.  Patient instructed to avoid sunlight, if possible.  When exposed to sunlight, patients should wear protective clothing, sunglasses, and sunscreen.  The patient was instructed to call the office immediately if the following severe adverse effects occur:  hearing changes, easy bruising/bleeding, severe headache, or vision changes.  The patient verbalized understanding of the proper use and possible adverse effects of minocycline.  All of the patient's questions and concerns were addressed.
Protopic Counseling: Patient may experience a mild burning sensation during topical application. Protopic is not approved in children less than 2 years of age. There have been case reports of hematologic and skin malignancies in patients using topical calcineurin inhibitors although causality is questionable.
Zyclara Counseling:  I discussed with the patient the risks of imiquimod including but not limited to erythema, scaling, itching, weeping, crusting, and pain.  Patient understands that the inflammatory response to imiquimod is variable from person to person and was educated regarded proper titration schedule.  If flu-like symptoms develop, patient knows to discontinue the medication and contact us.
Isotretinoin Counseling: Patient should get monthly blood tests, not donate blood, not drive at night if vision affected, not share medication, and not undergo elective surgery for 6 months after tx completed. Side effects reviewed, pt to contact office should one occur.
Terbinafine Counseling: Patient counseling regarding adverse effects of terbinafine including but not limited to headache, diarrhea, rash, upset stomach, liver function test abnormalities, itching, taste/smell disturbance, nausea, abdominal pain, and flatulence.  There is a rare possibility of liver failure that can occur when taking terbinafine.  The patient understands that a baseline LFT and kidney function test may be required. The patient verbalized understanding of the proper use and possible adverse effects of terbinafine.  All of the patient's questions and concerns were addressed.
Valtrex Counseling: I discussed with the patient the risks of valacyclovir including but not limited to kidney damage, nausea, vomiting and severe allergy.  The patient understands that if the infection seems to be worsening or is not improving, they are to call.
Minocycline Pregnancy And Lactation Text: This medication is Pregnancy Category D and not consider safe during pregnancy. It is also excreted in breast milk.
Terbinafine Pregnancy And Lactation Text: This medication is Pregnancy Category B and is considered safe during pregnancy. It is also excreted in breast milk and breast feeding isn't recommended.
Azithromycin Counseling:  I discussed with the patient the risks of azithromycin including but not limited to GI upset, allergic reaction, drug rash, diarrhea, and yeast infections.
Protopic Pregnancy And Lactation Text: This medication is Pregnancy Category C. It is unknown if this medication is excreted in breast milk when applied topically.
Valtrex Pregnancy And Lactation Text: this medication is Pregnancy Category B and is considered safe during pregnancy. This medication is not directly found in breast milk but it's metabolite acyclovir is present.
Isotretinoin Pregnancy And Lactation Text: This medication is Pregnancy Category X and is considered extremely dangerous during pregnancy. It is unknown if it is excreted in breast milk.
Otezla Pregnancy And Lactation Text: This medication is Pregnancy Category C and it isn't known if it is safe during pregnancy. It is unknown if it is excreted in breast milk.
Drysol Pregnancy And Lactation Text: This medication is considered safe during pregnancy and breast feeding.
Taltz Counseling: I discussed with the patient the risks of ixekizumab including but not limited to immunosuppression, serious infections, worsening of inflammatory bowel disease and drug reactions.  The patient understands that monitoring is required including a PPD at baseline and must alert us or the primary physician if symptoms of infection or other concerning signs are noted.
Finasteride Male Counseling: Finasteride Counseling:  I discussed with the patient the risks of use of finasteride including but not limited to decreased libido, decreased ejaculate volume, gynecomastia, and depression. Women should not handle medication.  All of the patient's questions and concerns were addressed.
Humira Counseling:  I discussed with the patient the risks of adalimumab including but not limited to myelosuppression, immunosuppression, autoimmune hepatitis, demyelinating diseases, lymphoma, and serious infections.  The patient understands that monitoring is required including a PPD at baseline and must alert us or the primary physician if symptoms of infection or other concerning signs are noted.
Elidel Counseling: Patient may experience a mild burning sensation during topical application. Elidel is not approved in children less than 2 years of age. There have been case reports of hematologic and skin malignancies in patients using topical calcineurin inhibitors although causality is questionable.
Quinolones Counseling:  I discussed with the patient the risks of fluoroquinolones including but not limited to GI upset, allergic reaction, drug rash, diarrhea, dizziness, photosensitivity, yeast infections, liver function test abnormalities, tendonitis/tendon rupture.
Azithromycin Pregnancy And Lactation Text: This medication is considered safe during pregnancy and is also secreted in breast milk.
Rhofade Counseling: Rhofade is a topical medication which can decrease superficial blood flow where applied. Side effects are uncommon and include stinging, redness and allergic reactions.
Oxybutynin Counseling:  I discussed with the patient the risks of oxybutynin including but not limited to skin rash, drowsiness, dry mouth, difficulty urinating, and blurred vision.
High Dose Vitamin A Counseling: Side effects reviewed, pt to contact office should one occur.
Finasteride Pregnancy And Lactation Text: This medication is absolutely contraindicated during pregnancy. It is unknown if it is excreted in breast milk.
Ilumya Counseling: I discussed with the patient the risks of tildrakizumab including but not limited to immunosuppression, malignancy, posterior leukoencephalopathy syndrome, and serious infections.  The patient understands that monitoring is required including a PPD at baseline and must alert us or the primary physician if symptoms of infection or other concerning signs are noted.
Cimetidine Counseling:  I discussed with the patient the risks of Cimetidine including but not limited to gynecomastia, headache, diarrhea, nausea, drowsiness, arrhythmias, pancreatitis, skin rashes, psychosis, bone marrow suppression and kidney toxicity.
Opioid Counseling: I discussed with the patient the potential side effects of opioids including but not limited to addiction, altered mental status, and depression. I stressed avoiding alcohol, benzodiazepines, muscle relaxants and sleep aids unless specifically okayed by a physician. The patient verbalized understanding of the proper use and possible adverse effects of opioids. All of the patient's questions and concerns were addressed. They were instructed to flush the remaining pills down the toilet if they did not need them for pain.
Cyclophosphamide Pregnancy And Lactation Text: This medication is Pregnancy Category D and it isn't considered safe during pregnancy. This medication is excreted in breast milk.
Cyclophosphamide Counseling:  I discussed with the patient the risks of cyclophosphamide including but not limited to hair loss, hormonal abnormalities, decreased fertility, abdominal pain, diarrhea, nausea and vomiting, bone marrow suppression and infection. The patient understands that monitoring is required while taking this medication.
High Dose Vitamin A Pregnancy And Lactation Text: High dose vitamin A therapy is contraindicated during pregnancy and breast feeding.
Oxybutynin Pregnancy And Lactation Text: This medication is Pregnancy Category B and is considered safe during pregnancy. It is unknown if it is excreted in breast milk.
Tremfya Counseling: I discussed with the patient the risks of guselkumab including but not limited to immunosuppression, serious infections, and drug reactions.  The patient understands that monitoring is required including a PPD at baseline and must alert us or the primary physician if symptoms of infection or other concerning signs are noted.
Gabapentin Counseling: I discussed with the patient the risks of gabapentin including but not limited to dizziness, somnolence, fatigue and ataxia.
Eucrisa Counseling: Patient may experience a mild burning sensation during topical application. Eucrisa is not approved in children less than 2 years of age.
Solaraze Counseling:  I discussed with the patient the risks of Solaraze including but not limited to erythema, scaling, itching, weeping, crusting, and pain.
Gabapentin Pregnancy And Lactation Text: This medication is Pregnancy Category C and isn't considered safe during pregnancy. It is excreted in breast milk.
Opioid Pregnancy And Lactation Text: These medications can lead to premature delivery and should be avoided during pregnancy. These medications are also present in breast milk in small amounts.
Rifampin Counseling: I discussed with the patient the risks of rifampin including but not limited to liver damage, kidney damage, red-orange body fluids, nausea/vomiting and severe allergy.
Bactrim Pregnancy And Lactation Text: This medication is Pregnancy Category D and is known to cause fetal risk.  It is also excreted in breast milk.
Bactrim Counseling:  I discussed with the patient the risks of sulfa antibiotics including but not limited to GI upset, allergic reaction, drug rash, diarrhea, dizziness, photosensitivity, and yeast infections.  Rarely, more serious reactions can occur including but not limited to aplastic anemia, agranulocytosis, methemoglobinemia, blood dyscrasias, liver or kidney failure, lung infiltrates or desquamative/blistering drug rashes.
Propranolol Counseling:  I discussed with the patient the risks of propranolol including but not limited to low heart rate, low blood pressure, low blood sugar, restlessness and increased cold sensitivity. They should call the office if they experience any of these side effects.
Infliximab Counseling:  I discussed with the patient the risks of infliximab including but not limited to myelosuppression, immunosuppression, autoimmune hepatitis, demyelinating diseases, lymphoma, and serious infections.  The patient understands that monitoring is required including a PPD at baseline and must alert us or the primary physician if symptoms of infection or other concerning signs are noted.
Xeljanz Counseling: I discussed with the patient the risks of Xeljanz therapy including increased risk of infection, liver issues, headache, diarrhea, or cold symptoms. Live vaccines should be avoided. They were instructed to call if they have any problems.
Solaraze Pregnancy And Lactation Text: This medication is Pregnancy Category B and is considered safe. There is some data to suggest avoiding during the third trimester. It is unknown if this medication is excreted in breast milk.
Cyclosporine Pregnancy And Lactation Text: This medication is Pregnancy Category C and it isn't know if it is safe during pregnancy. This medication is excreted in breast milk.
Cyclosporine Counseling:  I discussed with the patient the risks of cyclosporine including but not limited to hypertension, gingival hyperplasia,myelosuppression, immunosuppression, liver damage, kidney damage, neurotoxicity, lymphoma, and serious infections. The patient understands that monitoring is required including baseline blood pressure, CBC, CMP, lipid panel and uric acid, and then 1-2 times monthly CMP and blood pressure.
Doxepin Counseling:  Patient advised that the medication is sedating and not to drive a car after taking this medication. Patient informed of potential adverse effects including but not limited to dry mouth, urinary retention, and blurry vision.  The patient verbalized understanding of the proper use and possible adverse effects of doxepin.  All of the patient's questions and concerns were addressed.
Eucrisa Pregnancy And Lactation Text: This medication has not been assigned a Pregnancy Risk Category but animal studies failed to show danger with the topical medication. It is unknown if the medication is excreted in breast milk.
Propranolol Pregnancy And Lactation Text: This medication is Pregnancy Category C and it isn't known if it is safe during pregnancy. It is excreted in breast milk.
Glycopyrrolate Counseling:  I discussed with the patient the risks of glycopyrrolate including but not limited to skin rash, drowsiness, dry mouth, difficulty urinating, and blurred vision.
Arava Counseling:  Patient counseled regarding adverse effects of Arava including but not limited to nausea, vomiting, abnormalities in liver function tests. Patients may develop mouth sores, rash, diarrhea, and abnormalities in blood counts. The patient understands that monitoring is required including LFTs and blood counts.  There is a rare possibility of scarring of the liver and lung problems that can occur when taking methotrexate. Persistent nausea, loss of appetite, pale stools, dark urine, cough, and shortness of breath should be reported immediately. Patient advised to discontinue Arava treatment and consult with a physician prior to attempting conception. The patient will have to undergo a treatment to eliminate Arava from the body prior to conception.
Doxepin Pregnancy And Lactation Text: This medication is Pregnancy Category C and it isn't known if it is safe during pregnancy. It is also excreted in breast milk and breast feeding isn't recommended.
Hydroquinone Counseling:  Patient advised that medication may result in skin irritation, lightening (hypopigmentation), dryness, and burning.  In the event of skin irritation, the patient was advised to reduce the amount of the drug applied or use it less frequently.  Rarely, spots that are treated with hydroquinone can become darker (pseudoochronosis).  Should this occur, patient instructed to stop medication and call the office. The patient verbalized understanding of the proper use and possible adverse effects of hydroquinone.  All of the patient's questions and concerns were addressed.
Glycopyrrolate Pregnancy And Lactation Text: This medication is Pregnancy Category B and is considered safe during pregnancy. It is unknown if it is excreted breast milk.
Topical Retinoid counseling:  Patient advised to apply a pea-sized amount only at bedtime and wait 30 minutes after washing their face before applying.  If too drying, patient may add a non-comedogenic moisturizer. The patient verbalized understanding of the proper use and possible adverse effects of retinoids.  All of the patient's questions and concerns were addressed.
Xelfideliaz Pregnancy And Lactation Text: This medication is Pregnancy Category D and is not considered safe during pregnancy.  The risk during breast feeding is also uncertain.
Rifampin Pregnancy And Lactation Text: This medication is Pregnancy Category C and it isn't know if it is safe during pregnancy. It is also excreted in breast milk and should not be used if you are breast feeding.
Cephalexin Counseling: I counseled the patient regarding use of cephalexin as an antibiotic for prophylactic and/or therapeutic purposes. Cephalexin (commonly prescribed under brand name Keflex) is a cephalosporin antibiotic which is active against numerous classes of bacteria, including most skin bacteria. Side effects may include nausea, diarrhea, gastrointestinal upset, rash, hives, yeast infections, and in rare cases, hepatitis, kidney disease, seizures, fever, confusion, neurologic symptoms, and others. Patients with severe allergies to penicillin medications are cautioned that there is about a 10% incidence of cross-reactivity with cephalosporins. When possible, patients with penicillin allergies should use alternatives to cephalosporins for antibiotic therapy.
Birth Control Pills Counseling: Birth Control Pill Counseling: I discussed with the patient the potential side effects of OCPs including but not limited to increased risk of stroke, heart attack, thrombophlebitis, deep venous thrombosis, hepatic adenomas, breast changes, GI upset, headaches, and depression.  The patient verbalized understanding of the proper use and possible adverse effects of OCPs. All of the patient's questions and concerns were addressed.
Xolair Counseling:  Patient informed of potential adverse effects including but not limited to fever, muscle aches, rash and allergic reactions.  The patient verbalized understanding of the proper use and possible adverse effects of Xolair.  All of the patient's questions and concerns were addressed.
Rituxan Counseling:  I discussed with the patient the risks of Rituxan infusions. Side effects can include infusion reactions, severe drug rashes including mucocutaneous reactions, reactivation of latent hepatitis and other infections and rarely progressive multifocal leukoencephalopathy.  All of the patient's questions and concerns were addressed.
Benzoyl Peroxide Counseling: Patient counseled that medicine may cause skin irritation and bleach clothing.  In the event of skin irritation, the patient was advised to reduce the amount of the drug applied or use it less frequently.   The patient verbalized understanding of the proper use and possible adverse effects of benzoyl peroxide.  All of the patient's questions and concerns were addressed.
Hydroxychloroquine Counseling:  I discussed with the patient that a baseline ophthalmologic exam is needed at the start of therapy and every year thereafter while on therapy. A CBC may also be warranted for monitoring.  The side effects of this medication were discussed with the patient, including but not limited to agranulocytosis, aplastic anemia, seizures, rashes, retinopathy, and liver toxicity. Patient instructed to call the office should any adverse effect occur.  The patient verbalized understanding of the proper use and possible adverse effects of Plaquenil.  All the patient's questions and concerns were addressed.
Methotrexate Counseling:  Patient counseled regarding adverse effects of methotrexate including but not limited to nausea, vomiting, abnormalities in liver function tests. Patients may develop mouth sores, rash, diarrhea, and abnormalities in blood counts. The patient understands that monitoring is required including LFT's and blood counts.  There is a rare possibility of scarring of the liver and lung problems that can occur when taking methotrexate. Persistent nausea, loss of appetite, pale stools, dark urine, cough, and shortness of breath should be reported immediately. Patient advised to discontinue methotrexate treatment at least three months before attempting to become pregnant.  I discussed the need for folate supplements while taking methotrexate.  These supplements can decrease side effects during methotrexate treatment. The patient verbalized understanding of the proper use and possible adverse effects of methotrexate.  All of the patient's questions and concerns were addressed.
Sarecycline Counseling: Patient advised regarding possible photosensitivity and discoloration of the teeth, skin, lips, tongue and gums.  Patient instructed to avoid sunlight, if possible.  When exposed to sunlight, patients should wear protective clothing, sunglasses, and sunscreen.  The patient was instructed to call the office immediately if the following severe adverse effects occur:  hearing changes, easy bruising/bleeding, severe headache, or vision changes.  The patient verbalized understanding of the proper use and possible adverse effects of sarecycline.  All of the patient's questions and concerns were addressed.
Cephalexin Pregnancy And Lactation Text: This medication is Pregnancy Category B and considered safe during pregnancy.  It is also excreted in breast milk but can be used safely for shorter doses.
Hydroxyzine Counseling: Patient advised that the medication is sedating and not to drive a car after taking this medication.  Patient informed of potential adverse effects including but not limited to dry mouth, urinary retention, and blurry vision.  The patient verbalized understanding of the proper use and possible adverse effects of hydroxyzine.  All of the patient's questions and concerns were addressed.
Birth Control Pills Pregnancy And Lactation Text: This medication should be avoided if pregnant and for the first 30 days post-partum.
Hydroxyzine Pregnancy And Lactation Text: This medication is not safe during pregnancy and should not be taken. It is also excreted in breast milk and breast feeding isn't recommended.
Imiquimod Counseling:  I discussed with the patient the risks of imiquimod including but not limited to erythema, scaling, itching, weeping, crusting, and pain.  Patient understands that the inflammatory response to imiquimod is variable from person to person and was educated regarded proper titration schedule.  If flu-like symptoms develop, patient knows to discontinue the medication and contact us.
Tazorac Counseling:  Patient advised that medication is irritating and drying.  Patient may need to apply sparingly and wash off after an hour before eventually leaving it on overnight.  The patient verbalized understanding of the proper use and possible adverse effects of tazorac.  All of the patient's questions and concerns were addressed.
Hydroxychloroquine Pregnancy And Lactation Text: This medication has been shown to cause fetal harm but it isn't assigned a Pregnancy Risk Category. There are small amounts excreted in breast milk.
Xolair Pregnancy And Lactation Text: This medication is Pregnancy Category B and is considered safe during pregnancy. This medication is excreted in breast milk.
Benzoyl Peroxide Pregnancy And Lactation Text: This medication is Pregnancy Category C. It is unknown if benzoyl peroxide is excreted in breast milk.
Spironolactone Counseling: Patient advised regarding risks of diarrhea, abdominal pain, hyperkalemia, birth defects (for female patients), liver toxicity and renal toxicity. The patient may need blood work to monitor liver and kidney function and potassium levels while on therapy. The patient verbalized understanding of the proper use and possible adverse effects of spironolactone.  All of the patient's questions and concerns were addressed.
Methotrexate Pregnancy And Lactation Text: This medication is Pregnancy Category X and is known to cause fetal harm. This medication is excreted in breast milk.
Clindamycin Counseling: I counseled the patient regarding use of clindamycin as an antibiotic for prophylactic and/or therapeutic purposes. Clindamycin is active against numerous classes of bacteria, including skin bacteria. Side effects may include nausea, diarrhea, gastrointestinal upset, rash, hives, yeast infections, and in rare cases, colitis.
Rituxan Pregnancy And Lactation Text: This medication is Pregnancy Category C and it isn't know if it is safe during pregnancy. It is unknown if this medication is excreted in breast milk but similar antibodies are known to be excreted.
Siliq Counseling:  I discussed with the patient the risks of Siliq including but not limited to new or worsening depression, suicidal thoughts and behavior, immunosuppression, malignancy, posterior leukoencephalopathy syndrome, and serious infections.  The patient understands that monitoring is required including a PPD at baseline and must alert us or the primary physician if symptoms of infection or other concerning signs are noted. There is also a special program designed to monitor depression which is required with Siliq.
Carac Counseling:  I discussed with the patient the risks of Carac including but not limited to erythema, scaling, itching, weeping, crusting, and pain.
Niacinamide Counseling: I recommended taking niacin or niacinamide, also know as vitamin B3, twice daily. Recent evidence suggests that taking vitamin B3 (500 mg twice daily) can reduce the risk of actinic keratoses and non-melanoma skin cancers. Side effects of vitamin B3 include flushing and headache.
Tazorac Pregnancy And Lactation Text: This medication is not safe during pregnancy. It is unknown if this medication is excreted in breast milk.
Prednisone Counseling:  I discussed with the patient the risks of prolonged use of prednisone including but not limited to weight gain, insomnia, osteoporosis, mood changes, diabetes, susceptibility to infection, glaucoma and high blood pressure.  In cases where prednisone use is prolonged, patients should be monitored with blood pressure checks, serum glucose levels and an eye exam.  Additionally, the patient may need to be placed on GI prophylaxis, PCP prophylaxis, and calcium and vitamin D supplementation and/or a bisphosphonate.  The patient verbalized understanding of the proper use and the possible adverse effects of prednisone.  All of the patient's questions and concerns were addressed.
Clofazimine Counseling:  I discussed with the patient the risks of clofazimine including but not limited to skin and eye pigmentation, liver damage, nausea/vomiting, gastrointestinal bleeding and allergy.
Spironolactone Pregnancy And Lactation Text: This medication can cause feminization of the male fetus and should be avoided during pregnancy. The active metabolite is also found in breast milk.
Tetracycline Counseling: Patient counseled regarding possible photosensitivity and increased risk for sunburn.  Patient instructed to avoid sunlight, if possible.  When exposed to sunlight, patients should wear protective clothing, sunglasses, and sunscreen.  The patient was instructed to call the office immediately if the following severe adverse effects occur:  hearing changes, easy bruising/bleeding, severe headache, or vision changes.  The patient verbalized understanding of the proper use and possible adverse effects of tetracycline.  All of the patient's questions and concerns were addressed. Patient understands to avoid pregnancy while on therapy due to potential birth defects.
Cimzia Counseling:  I discussed with the patient the risks of Cimzia including but not limited to immunosuppression, allergic reactions and infections.  The patient understands that monitoring is required including a PPD at baseline and must alert us or the primary physician if symptoms of infection or other concerning signs are noted.
Clindamycin Pregnancy And Lactation Text: This medication can be used in pregnancy if certain situations. Clindamycin is also present in breast milk.
Niacinamide Pregnancy And Lactation Text: These medications are considered safe during pregnancy.
Topical Clindamycin Counseling: Patient counseled that this medication may cause skin irritation or allergic reactions.  In the event of skin irritation, the patient was advised to reduce the amount of the drug applied or use it less frequently.   The patient verbalized understanding of the proper use and possible adverse effects of clindamycin.  All of the patient's questions and concerns were addressed.
Minoxidil Counseling: Minoxidil is a topical medication which can increase blood flow where it is applied. It is uncertain how this medication increases hair growth. Side effects are uncommon and include stinging and allergic reactions.
SSKI Counseling:  I discussed with the patient the risks of SSKI including but not limited to thyroid abnormalities, metallic taste, GI upset, fever, headache, acne, arthralgias, paraesthesias, lymphadenopathy, easy bleeding, arrhythmias, and allergic reaction.
Doxycycline Counseling:  Patient counseled regarding possible photosensitivity and increased risk for sunburn.  Patient instructed to avoid sunlight, if possible.  When exposed to sunlight, patients should wear protective clothing, sunglasses, and sunscreen.  The patient was instructed to call the office immediately if the following severe adverse effects occur:  hearing changes, easy bruising/bleeding, severe headache, or vision changes.  The patient verbalized understanding of the proper use and possible adverse effects of doxycycline.  All of the patient's questions and concerns were addressed.
Albendazole Counseling:  I discussed with the patient the risks of albendazole including but not limited to cytopenia, kidney damage, nausea/vomiting and severe allergy.  The patient understands that this medication is being used in an off-label manner.
Cimzia Pregnancy And Lactation Text: This medication crosses the placenta but can be considered safe in certain situations. Cimzia may be excreted in breast milk.
Simponi Counseling:  I discussed with the patient the risks of golimumab including but not limited to myelosuppression, immunosuppression, autoimmune hepatitis, demyelinating diseases, lymphoma, and serious infections.  The patient understands that monitoring is required including a PPD at baseline and must alert us or the primary physician if symptoms of infection or other concerning signs are noted.
Nsaids Counseling: NSAID Counseling: I discussed with the patient that NSAIDs should be taken with food. Prolonged use of NSAIDs can result in the development of stomach ulcers.  Patient advised to stop taking NSAIDs if abdominal pain occurs.  The patient verbalized understanding of the proper use and possible adverse effects of NSAIDs.  All of the patient's questions and concerns were addressed.
Sski Pregnancy And Lactation Text: This medication is Pregnancy Category D and isn't considered safe during pregnancy. It is excreted in breast milk.
Colchicine Counseling:  Patient counseled regarding adverse effects including but not limited to stomach upset (nausea, vomiting, stomach pain, or diarrhea).  Patient instructed to limit alcohol consumption while taking this medication.  Colchicine may reduce blood counts especially with prolonged use.  The patient understands that monitoring of kidney function and blood counts may be required, especially at baseline. The patient verbalized understanding of the proper use and possible adverse effects of colchicine.  All of the patient's questions and concerns were addressed.
Cosentyx Counseling:  I discussed with the patient the risks of Cosentyx including but not limited to worsening of Crohn's disease, immunosuppression, allergic reactions and infections.  The patient understands that monitoring is required including a PPD at baseline and must alert us or the primary physician if symptoms of infection or other concerning signs are noted.
Doxycycline Pregnancy And Lactation Text: This medication is Pregnancy Category D and not consider safe during pregnancy. It is also excreted in breast milk but is considered safe for shorter treatment courses.

## 2020-12-16 NOTE — PROGRESS NOTES
"Chief Complaint   Patient presents with   • New Patient   • HTN (Controlled)   • Dyslipidemia       Subjective:   Shadi Bentley is a 50 y.o. male who presents today establish cardiology care.    The patient has a significant past medical history of a myocardial infarction, LAD stent in 2003 at age 33 [place], [place], hypertension, dyslipidemia, diabetes mellitus diagnosed 2019, chronic tobacco use 34 pack years abstinent 2019, VENESSA on CPAP therapy x12 years and psoriatic arthritis x6 years on targeted immunotherapy.    He moved from [place], [place] to [place] in 7/2019 for work.  Employed as a .    Has been stable from a cardiac standpoint concerning his CAD and has been on Metformin, aspirin and simvastatin x17 years.  He has had to follow-up coronary angiograms last 1 approximately 10 years ago with no additional intervention.  Last cardiology visit was in 2018 [place], [place] at which time it sounds like he had a nuclear stress test.    Family history significant for father suffering a heart attack at age 41, alive at age 65.  Brother who was age 35 400 pounds \"dying of heart disease\".    Past Medical History:   Diagnosis Date   • CAD (coronary artery disease)    • Diabetes (HCC)    • Hyperlipidemia    • Psoriasis      No past surgical history on file.  Family History   Problem Relation Age of Onset   • Diabetes Father    • Heart Disease Father    • Hypertension Father    • Hyperlipidemia Father      Social History     Socioeconomic History   • Marital status: Unknown     Spouse name: Not on file   • Number of children: Not on file   • Years of education: Not on file   • Highest education level: Not on file   Occupational History   • Not on file   Social Needs   • Financial resource strain: Not on file   • Food insecurity     Worry: Not on file     Inability: Not on file   • Transportation needs     Medical: Not on file     Non-medical: Not on file   Tobacco Use "   • Smoking status: Former Smoker     Packs/day: 1.00     Years: 30.00     Pack years: 30.00     Types: Cigarettes   • Smokeless tobacco: Never Used   • Tobacco comment: quit year and a half ago   Substance and Sexual Activity   • Alcohol use: Not Currently     Frequency: Never   • Drug use: Never   • Sexual activity: Not on file   Lifestyle   • Physical activity     Days per week: Not on file     Minutes per session: Not on file   • Stress: Not on file   Relationships   • Social connections     Talks on phone: Not on file     Gets together: Not on file     Attends [employer] service: Not on file     Active member of club or organization: Not on file     Attends meetings of clubs or organizations: Not on file     Relationship status: Not on file   • Intimate partner violence     Fear of current or ex partner: Not on file     Emotionally abused: Not on file     Physically abused: Not on file     Forced sexual activity: Not on file   Other Topics Concern   • Not on file   Social History Narrative   • Not on file     Allergies   Allergen Reactions   • Lisinopril      Put in hospital doesn't know effect   • Advil [Ibuprofen]      Stomach turned into swiss cheese, ulcers   • Morphine      itchy     Outpatient Encounter Medications as of 12/16/2020   Medication Sig Dispense Refill   • rosuvastatin (CRESTOR) 20 MG Tab Take 1 Tab by mouth every evening. 90 Tab 3   • metoprolol (LOPRESSOR) 50 MG Tab Take 1 Tab by mouth 2 times a day. 180 Tab 3   • nitroglycerin (NITROSTAT) 0.4 MG SL Tab Place 1 Tab under the tongue as needed for Chest Pain. 25 Tab 3   • ASPIRIN 81 PO Take  by mouth.     • Melatonin 10 MG Tab Take  by mouth.     • Secukinumab, 300 MG Dose, (COSENTYX, 300 MG DOSE,) 150 MG/ML Solution Prefilled Syringe Inject  as instructed. 1 time a month     • [DISCONTINUED] simvastatin (ZOCOR) 80 MG tablet Take 80 mg by mouth every evening.     • [DISCONTINUED] metoprolol (LOPRESSOR) 50 MG Tab Take 1 Tab by mouth 2 times a  "day. 180 Tab 0     No facility-administered encounter medications on file as of 12/16/2020.      Review of Systems   Constitutional: Negative for chills and fever.   HENT: Negative for congestion.    Eyes: Negative for blurred vision.   Respiratory: Negative for shortness of breath.    Cardiovascular: Negative for chest pain, palpitations, orthopnea, leg swelling and PND.   Gastrointestinal: Negative for abdominal pain.   Genitourinary: Negative for dysuria.   Musculoskeletal: Positive for joint pain. Negative for myalgias.   Skin: Positive for rash.   Neurological: Negative for dizziness and loss of consciousness.   Psychiatric/Behavioral: The patient does not have insomnia.         Objective:   /60 (BP Location: Left arm, Patient Position: Sitting, BP Cuff Size: Adult)   Pulse 76   Ht 1.727 m (5' 8\")   Wt 107 kg (236 lb)   SpO2 97%   BMI 35.88 kg/m²     Physical Exam   Constitutional: He is oriented to person, place, and time. He appears well-developed and well-nourished. No distress.   Eyes: Pupils are equal, round, and reactive to light. Conjunctivae and EOM are normal.   Neck: Normal range of motion. Neck supple. No JVD present.   Cardiovascular: Normal rate, regular rhythm, normal heart sounds and intact distal pulses. Exam reveals no gallop and no friction rub.   No murmur heard.  Pulses:       Carotid pulses are 2+ on the right side and 2+ on the left side.  Pulmonary/Chest: Effort normal and breath sounds normal. No accessory muscle usage. No respiratory distress. He has no wheezes. He has no rales.   Abdominal: Soft. He exhibits no distension and no mass. There is no hepatosplenomegaly. There is no abdominal tenderness.   Protuberant.   Musculoskeletal:         General: No edema.   Neurological: He is alert and oriented to person, place, and time.   Skin: Skin is warm, dry and intact. No rash noted. No cyanosis. Nails show no clubbing.   Psychiatric: He has a normal mood and affect. His behavior " is normal.   Vitals reviewed.    EKG 12/16/2020 normal sinus rhythm, rate 67 within normal limits personally reviewed and interpreted.    Assessment:     1. Coronary artery disease involving native coronary artery of native heart without angina pectoris  EKG    LIPID PANEL   2. H/O heart artery stent     3. Essential hypertension     4. Dyslipidemia       Medical Decision Making:  Today's Assessment / Status / Plan:     Assessment  1.  CAD.  2.  PCI LAD stent 2003 [place], [place].  3.  Hypertension.  4.  Dyslipidemia.  5.  Diabetes mellitus.  6.  VENESSA on CPAP.  7.  Psoriatic arthritis.  8.  H/O chronic tobacco use 34 pack years, abstinent.  9.  Obesity.  10.  Joint pain possible contribution from simvastatin/metoprolol interaction.    Recommendation Discussion  1.  The patient's current cardiac status is stable regarding his CAD and previous PCI no angina pectoris hypertension normal BP and dyslipidemia currently on statin therapy.  2.  Renewed his medications but switched simvastatin to Crestor to see if joint pain improves.  3.  He will bring in his medical records from his most recent evaluation.  4.  RTC 6 months.

## 2020-12-16 NOTE — HPI: RASH (PSORIASIS)
Do You Have A Family History Of Psoriasis?: no
How Severe Is Your Psoriasis?: moderate
Is This A New Presentation, Or A Follow-Up?: Psoriasis
Additional History: Diagnosed with psoriasis at 11 years old. Pt was well controlled on Cosentyx and has been on the medication for about a year. Pt recently moved cities and changed jobs; he feels the increased stress is causing him to have an outbreak, with psoriasis presentation now affecting the ears, eye brows, and central face. As well, he complains of morning joint stiffness in the ankles, shoulders, and knees that is inadequately controlled. He is interested in switching to a different biologic.

## 2020-12-16 NOTE — PROCEDURE: TALTZ INITIATION
Pregnancy And Lactation Warning Text: The risk during pregnancy and breastfeeding is uncertain with this medication.
Is Phototherapy Contraindicated?: No
Taltz Monitoring Guidelines: A yearly test for tuberculosis is required while taking Taltz.
Taltz Dosing: 160mg SC x 1 at weeks 0 then 80mg SC at weeks 2, 4, 6, 8, 10 and 12 then 80mg SC every four weeks
Is Methotrexate Contraindicated?: Yes
Diagnosis (Required): Psoriasis
Detail Level: Zone

## 2020-12-30 ENCOUNTER — TELEPHONE (OUTPATIENT)
Dept: MEDICAL GROUP | Age: 50
End: 2020-12-30

## 2020-12-30 NOTE — TELEPHONE ENCOUNTER
VOICEMAIL  1. Caller Name: Melina Bentley (pt sister)                      Call Back Number: [phone]    2. Message: Per Melina; pt wife has just passed 12/29 and he (Shadi Bentley) is not doing well. He is experiencing increased anxiety, increased heart rate and depression. Would like some guidance on how to help him deal with the grief. Please advise.     3. Patient approves office to leave a detailed voicemail/MyChart message: yes

## 2020-12-31 ENCOUNTER — OFFICE VISIT (OUTPATIENT)
Dept: MEDICAL GROUP | Age: 50
End: 2020-12-31
Payer: COMMERCIAL

## 2020-12-31 VITALS
TEMPERATURE: 97.8 F | HEIGHT: 68 IN | OXYGEN SATURATION: 96 % | WEIGHT: 231 LBS | DIASTOLIC BLOOD PRESSURE: 70 MMHG | BODY MASS INDEX: 35.01 KG/M2 | SYSTOLIC BLOOD PRESSURE: 116 MMHG | HEART RATE: 106 BPM

## 2020-12-31 DIAGNOSIS — F51.01 PRIMARY INSOMNIA: ICD-10-CM

## 2020-12-31 DIAGNOSIS — L40.50 PSORIATIC ARTHRITIS (HCC): ICD-10-CM

## 2020-12-31 DIAGNOSIS — F41.8 SITUATIONAL ANXIETY: ICD-10-CM

## 2020-12-31 DIAGNOSIS — F43.21 GRIEF: ICD-10-CM

## 2020-12-31 DIAGNOSIS — F43.21 SITUATIONAL DEPRESSION: ICD-10-CM

## 2020-12-31 PROCEDURE — 99214 OFFICE O/P EST MOD 30 MIN: CPT | Performed by: INTERNAL MEDICINE

## 2020-12-31 RX ORDER — PREDNISONE 20 MG/1
20 TABLET ORAL DAILY
Qty: 10 TAB | Refills: 0 | Status: SHIPPED | OUTPATIENT
Start: 2020-12-31 | End: 2021-01-19

## 2020-12-31 RX ORDER — LORAZEPAM 2 MG/1
2 TABLET ORAL EVERY 6 HOURS PRN
Qty: 60 TAB | Refills: 0 | Status: SHIPPED | OUTPATIENT
Start: 2020-12-31 | End: 2021-01-30

## 2020-12-31 RX ORDER — FLUOXETINE 10 MG/1
CAPSULE ORAL
Qty: 180 CAP | Refills: 0 | Status: SHIPPED | OUTPATIENT
Start: 2020-12-31 | End: 2021-03-13 | Stop reason: SDUPTHER

## 2020-12-31 RX ORDER — LORAZEPAM 2 MG/1
2 TABLET ORAL EVERY 6 HOURS PRN
Qty: 60 TAB | Refills: 0 | Status: SHIPPED | OUTPATIENT
Start: 2020-12-31 | End: 2020-12-31 | Stop reason: SDUPTHER

## 2020-12-31 ASSESSMENT — PATIENT HEALTH QUESTIONNAIRE - PHQ9
5. POOR APPETITE OR OVEREATING: 3 - NEARLY EVERY DAY
CLINICAL INTERPRETATION OF PHQ2 SCORE: 6
SUM OF ALL RESPONSES TO PHQ QUESTIONS 1-9: 25

## 2020-12-31 ASSESSMENT — ANXIETY QUESTIONNAIRES
1. FEELING NERVOUS, ANXIOUS, OR ON EDGE: NEARLY EVERY DAY
4. TROUBLE RELAXING: NEARLY EVERY DAY
5. BEING SO RESTLESS THAT IT IS HARD TO SIT STILL: NEARLY EVERY DAY
2. NOT BEING ABLE TO STOP OR CONTROL WORRYING: NEARLY EVERY DAY
7. FEELING AFRAID AS IF SOMETHING AWFUL MIGHT HAPPEN: NEARLY EVERY DAY
6. BECOMING EASILY ANNOYED OR IRRITABLE: NEARLY EVERY DAY
3. WORRYING TOO MUCH ABOUT DIFFERENT THINGS: NEARLY EVERY DAY
GAD7 TOTAL SCORE: 21

## 2020-12-31 ASSESSMENT — FIBROSIS 4 INDEX: FIB4 SCORE: 0.66

## 2020-12-31 NOTE — PROGRESS NOTES
CC: Shadi Bentley is a 50 y.o. male who presents for the following     Grief, situational anxiety, depression, insomnia  New problems.    Onset:  recent  Trigger 47 y/o wife suddenly past away due to post-hysterectomy complications  Mood/anxiety currently does affect: daily activities/sleep.  Current treatment: start xanax and fluoxetine     Risk factors:   · Depression, anxiety  · H/o phobia: no  · H/o panic attacks: no  · H/o hypomanic or manic episode: no  · Substance abuse  (alcohol,  prescription drugs caffeine, tobacco): no  · Family support: yes  · Living alone:  no  · Family history of psych disorders: neg  · Stress: no  · PMH of abuse (sexual, physical, emotional abuse; neglect): no      MYCHAL 7 11/3/2020 12/31/2020   MYCHAL-7 Total Score 0 21     Depression Screen (PHQ-2/PHQ-9) 11/3/2020 12/31/2020   PHQ-2 Total Score 0 6   PHQ-9 Total Score - 25     Suicidal ideation: no    Psoriatic arthritis  Complains of flareup of psoriatic arthritis, with bilateral knee pain, requested prednisone refill.  No fever, chills, redness, swelling, trauma  He has been followed up by dermatology, on cosentyx.    Current Outpatient Medications   Medication Sig Dispense Refill   • rosuvastatin (CRESTOR) 20 MG Tab Take 1 Tab by mouth every evening. 90 Tab 3   • metoprolol (LOPRESSOR) 50 MG Tab Take 1 Tab by mouth 2 times a day. 180 Tab 3   • nitroglycerin (NITROSTAT) 0.4 MG SL Tab Place 1 Tab under the tongue as needed for Chest Pain. 25 Tab 3   • metFORMIN ER (GLUCOPHAGE XR) 500 MG TABLET SR 24 HR Take 2 Tabs by mouth 2 times a day. 360 Tab 0   • ASPIRIN 81 PO Take  by mouth.     • Melatonin 10 MG Tab Take  by mouth.     • Secukinumab, 300 MG Dose, (COSENTYX, 300 MG DOSE,) 150 MG/ML Solution Prefilled Syringe Inject  as instructed. 1 time a month       No current facility-administered medications for this visit.      He  has a past medical history of CAD (coronary artery disease), Diabetes (HCC), Hyperlipidemia, and Psoriasis.  He   "has no past surgical history on file.  Social History     Tobacco Use   • Smoking status: Former Smoker     Packs/day: 1.00     Years: 30.00     Pack years: 30.00     Types: Cigarettes   • Smokeless tobacco: Never Used   • Tobacco comment: quit year and a half ago   Substance Use Topics   • Alcohol use: Not Currently     Frequency: Never   • Drug use: Never     Social History     Social History Narrative   • Not on file     Family History   Problem Relation Age of Onset   • Diabetes Father    • Heart Disease Father    • Hypertension Father    • Hyperlipidemia Father      Family Status   Relation Name Status   • Fa  (Not Specified)     ROS   Taking supplements to help mood or symptoms: yes  Using alcohol or other substances to help mood or symptoms: no  No polydipsia, polyuria.  No temperature intolerance.  No bowel changes  Denies symptoms of tammy: grandiosity, euphoria, need for little or no sleep, rapid pressured speech, spending sprees, reckless or risky behavior, hypersexual behavior.   No visual or auditory hallucinations.    PHYSICAL EXAM   Blood Pressure 116/70 (BP Location: Right arm, Patient Position: Sitting, BP Cuff Size: Adult)   Pulse (Abnormal) 106   Temperature 36.6 °C (97.8 °F) (Temporal)   Height 1.727 m (5' 8\")   Weight 104.8 kg (231 lb)   Oxygen Saturation 96%   General: normal speech pattern and content   Clothing and grooming normal.  Behavior: no psychomotor abnormalities or impulsivity  Eye contact: good   Affect: normal  Though content: normal insight and reasoning, no evidence of psychotic process.   Neck/Thyroid: No adenopathy, no palpable thyroid nodules.  Lungs: CTAB  Heart: RRR no ectopy  Neuro: Gait normal. Reflexes normal and symmetric. Sensation grossly intact        Labs     Labs are reviewed and discussed with a patient  Lab Results   Component Value Date/Time    CHOLSTRLTOT 166 12/05/2020 07:07 AM    LDL 98 12/05/2020 07:07 AM    HDL 40 12/05/2020 07:07 AM    TRIGLYCERIDE 141 " 12/05/2020 07:07 AM       Lab Results   Component Value Date/Time    SODIUM 134 (L) 12/05/2020 07:07 AM    POTASSIUM 4.5 12/05/2020 07:07 AM    CHLORIDE 100 12/05/2020 07:07 AM    CO2 24 12/05/2020 07:07 AM    GLUCOSE 221 (H) 12/05/2020 07:07 AM    BUN 13 12/05/2020 07:07 AM    CREATININE 0.80 12/05/2020 07:07 AM     Lab Results   Component Value Date/Time    ALKPHOSPHAT 93 12/05/2020 07:07 AM    ASTSGOT 19 12/05/2020 07:07 AM    ALTSGPT 33 12/05/2020 07:07 AM    TBILIRUBIN 0.4 12/05/2020 07:07 AM      Lab Results   Component Value Date/Time    HBA1C 8.5 (H) 12/05/2020 07:07 AM    HBA1C 7.1 (A) 06/22/2020 10:39 AM    HBA1C 12.0 (A) 11/30/2019 01:50 PM     No results found for: TSH  No results found for: FREET4    Lab Results   Component Value Date/Time    WBC 10.8 12/05/2020 07:07 AM    RBC 5.12 12/05/2020 07:07 AM    HEMOGLOBIN 16.7 12/05/2020 07:07 AM    HEMATOCRIT 48.1 12/05/2020 07:07 AM    MCV 93.9 12/05/2020 07:07 AM    MCH 32.6 12/05/2020 07:07 AM    MCHC 34.7 12/05/2020 07:07 AM    MPV 9.2 12/05/2020 07:07 AM    NEUTSPOLYS 65.00 12/05/2020 07:07 AM    LYMPHOCYTES 23.50 12/05/2020 07:07 AM    MONOCYTES 7.10 12/05/2020 07:07 AM    EOSINOPHILS 3.20 12/05/2020 07:07 AM    BASOPHILS 0.60 12/05/2020 07:07 AM      Imaging     None    ASSESMENT AND PLAN     1. Grief  Discussed treatment options, patient declined psychiatry or counseling referrals  - FLUoxetine (PROZAC) 10 MG Cap; Take 1 cap x 2 weeks QD, then 2 cap daily po  Dispense: 180 Cap; Refill: 0  - LORazepam (ATIVAN) 2 MG tablet; Take 1 Tab by mouth every 6 hours as needed for Anxiety for up to 30 days.  Dispense: 60 Tab; Refill: 0  2. Situational anxiety  - FLUoxetine (PROZAC) 10 MG Cap; Take 1 cap x 2 weeks QD, then 2 cap daily po  Dispense: 180 Cap; Refill: 0  - LORazepam (ATIVAN) 2 MG tablet; Take 1 Tab by mouth every 6 hours as needed for Anxiety for up to 30 days.  Dispense: 60 Tab; Refill: 0  3. Situational depression  - FLUoxetine (PROZAC) 10 MG Cap;  Take 1 cap x 2 weeks QD, then 2 cap daily po  Dispense: 180 Cap; Refill: 0  - Patient has been identified as having a positive depression screening. Appropriate orders and counseling have been given.  - LORazepam (ATIVAN) 2 MG tablet; Take 1 Tab by mouth every 6 hours as needed for Anxiety for up to 30 days.  Dispense: 60 Tab; Refill: 0  4. Primary insomnia  - FLUoxetine (PROZAC) 10 MG Cap; Take 1 cap x 2 weeks QD, then 2 cap daily po  Dispense: 180 Cap; Refill: 0  - LORazepam (ATIVAN) 2 MG tablet; Take 1 Tab by mouth every 6 hours as needed for Anxiety for up to 30 days.  Dispense: 60 Tab; Refill: 0    Obtained and reviewed patient utilization report from [facility] pharmacy database on 12/31/2020 10:14 AM  prior to writing prescription for controlled substance II, III or IV per [place] bill . Based on assessment of the report, the prescription is medically necessary.     · Reviewed concept of biochemical imbalance of neurotransmitters and rationale for treatment. SSRI advised. Lack of immediate symptom relief and need to keep taking medicine reviewed.   · Medicaton risks, benefits, indication, dose and dosing scheduled reviewed. Common side effects and drug interactions (if any) discussed. Pt advised to read written information provided by the pharmacist.   · Discussed improved therapeutic response with counseling and medication combined, compared to either alone.   · Advised to monitor for symptoms such as worsening depression, suicidal ideation, anxiety, agitation, panic attacks, insomnia, irritability, hostility, aggressiveness, impulsivity, hypomania and tammy. Symptoms may represent precursors to emerging suicidality, especially if these symptoms are severe, abrupt in onset, or were not part of the patient's presenting symptoms.     5. Psoriatic arthritis (HCC)  Advised to contact dermatologist  - predniSONE (DELTASONE) 20 MG Tab; Take 1 Tab by mouth every day.  Dispense: 10 Tab; Refill: 0    Smoking  Counseling: Nonsmoker    Follow up: in 1 month, earlier prn    Please note that this dictation was created using voice recognition software. Despite all efforts, some minor grammar mistakes are possible.

## 2021-01-19 ENCOUNTER — OFFICE VISIT (OUTPATIENT)
Dept: MEDICAL GROUP | Age: 51
End: 2021-01-19

## 2021-01-19 VITALS
SYSTOLIC BLOOD PRESSURE: 122 MMHG | HEART RATE: 79 BPM | HEIGHT: 68 IN | BODY MASS INDEX: 32.74 KG/M2 | OXYGEN SATURATION: 97 % | WEIGHT: 216 LBS | DIASTOLIC BLOOD PRESSURE: 70 MMHG | TEMPERATURE: 97.7 F

## 2021-01-19 DIAGNOSIS — G47.9 SLEEP DISORDER: ICD-10-CM

## 2021-01-19 DIAGNOSIS — F43.21 SITUATIONAL DEPRESSION: ICD-10-CM

## 2021-01-19 DIAGNOSIS — F43.21 GRIEF: ICD-10-CM

## 2021-01-19 DIAGNOSIS — F41.8 SITUATIONAL ANXIETY: ICD-10-CM

## 2021-01-19 PROCEDURE — 99214 OFFICE O/P EST MOD 30 MIN: CPT | Performed by: INTERNAL MEDICINE

## 2021-01-19 ASSESSMENT — ANXIETY QUESTIONNAIRES
GAD7 TOTAL SCORE: 20
7. FEELING AFRAID AS IF SOMETHING AWFUL MIGHT HAPPEN: NEARLY EVERY DAY
1. FEELING NERVOUS, ANXIOUS, OR ON EDGE: NEARLY EVERY DAY
2. NOT BEING ABLE TO STOP OR CONTROL WORRYING: NEARLY EVERY DAY
4. TROUBLE RELAXING: NEARLY EVERY DAY
3. WORRYING TOO MUCH ABOUT DIFFERENT THINGS: NEARLY EVERY DAY
6. BECOMING EASILY ANNOYED OR IRRITABLE: MORE THAN HALF THE DAYS
5. BEING SO RESTLESS THAT IT IS HARD TO SIT STILL: NEARLY EVERY DAY

## 2021-01-19 ASSESSMENT — PATIENT HEALTH QUESTIONNAIRE - PHQ9
SUM OF ALL RESPONSES TO PHQ QUESTIONS 1-9: 24
5. POOR APPETITE OR OVEREATING: 3 - NEARLY EVERY DAY
CLINICAL INTERPRETATION OF PHQ2 SCORE: 6

## 2021-01-19 ASSESSMENT — FIBROSIS 4 INDEX: FIB4 SCORE: 0.66

## 2021-01-19 NOTE — PROGRESS NOTES
CC: Shadi Bentley is a 50 y.o. male who presents for follow up grief    Grief, situational anxiety, depression, insomnia  Interval course   - no significant improvement on 20 mg of fluoxetine now, lorazepam prn    Onset:  recent  Trigger 49 y/o wife suddenly past away due to post-hysterectomy complications  Mood/anxiety currently does affect: daily activities/sleep.  Current treatment: on lorazepam prn and fluoxetine 20 mg per day     Risk factors:   · Depression, anxiety  · H/o phobia: no  · H/o panic attacks: no  · H/o hypomanic or manic episode: no  · Substance abuse  (alcohol,  prescription drugs caffeine, tobacco): no  · Family support: yes  · Living alone:  no  · Family history of psych disorders: neg  · Stress: no  · PMH of abuse (sexual, physical, emotional abuse; neglect): no     MYCHAL 7 11/3/2020 12/31/2020 1/19/2021   MYCHAL-7 Total Score 0 21 20     Depression Screen (PHQ-2/PHQ-9) 11/3/2020 12/31/2020 1/19/2021   PHQ-2 Total Score 0 6 6   PHQ-9 Total Score - 25 24     Current Outpatient Medications   Medication Sig Dispense Refill   • FLUoxetine (PROZAC) 10 MG Cap Take 1 cap x 2 weeks QD, then 2 cap daily po 180 Cap 0   • predniSONE (DELTASONE) 20 MG Tab Take 1 Tab by mouth every day. 10 Tab 0   • LORazepam (ATIVAN) 2 MG tablet Take 1 Tab by mouth every 6 hours as needed for Anxiety for up to 30 days. 60 Tab 0   • rosuvastatin (CRESTOR) 20 MG Tab Take 1 Tab by mouth every evening. 90 Tab 3   • metoprolol (LOPRESSOR) 50 MG Tab Take 1 Tab by mouth 2 times a day. 180 Tab 3   • nitroglycerin (NITROSTAT) 0.4 MG SL Tab Place 1 Tab under the tongue as needed for Chest Pain. 25 Tab 3   • metFORMIN ER (GLUCOPHAGE XR) 500 MG TABLET SR 24 HR Take 2 Tabs by mouth 2 times a day. 360 Tab 0   • ASPIRIN 81 PO Take  by mouth.     • Melatonin 10 MG Tab Take  by mouth.     • Secukinumab, 300 MG Dose, (COSENTYX, 300 MG DOSE,) 150 MG/ML Solution Prefilled Syringe Inject  as instructed. 1 time a month       No current  "facility-administered medications for this visit.      He  has a past medical history of CAD (coronary artery disease), Diabetes (HCC), Hyperlipidemia, and Psoriasis.  He  has no past surgical history on file.  Social History     Tobacco Use   • Smoking status: Former Smoker     Packs/day: 1.00     Years: 30.00     Pack years: 30.00     Types: Cigarettes   • Smokeless tobacco: Never Used   • Tobacco comment: quit year and a half ago   Substance Use Topics   • Alcohol use: Not Currently     Frequency: Never   • Drug use: Never     Social History     Social History Narrative   • Not on file     Family History   Problem Relation Age of Onset   • Diabetes Father    • Heart Disease Father    • Hypertension Father    • Hyperlipidemia Father      Family Status   Relation Name Status   • Fa  (Not Specified)     ROS   Taking supplements to help mood or symptoms: yes  Using alcohol or other substances to help mood or symptoms: no  No polydipsia, polyuria.  No temperature intolerance.  No bowel changes  Denies symptoms of tammy: grandiosity, euphoria, need for little or no sleep, rapid pressured speech, spending sprees, reckless or risky behavior, hypersexual behavior.   No visual or auditory hallucinations.    Labs     None.     PHYSICAL EXAM   Blood Pressure 122/70 (BP Location: Left arm, Patient Position: Sitting, BP Cuff Size: Adult)   Pulse 79   Temperature 36.5 °C (97.7 °F) (Temporal)   Height 1.727 m (5' 8\")   Weight 98 kg (216 lb)   Oxygen Saturation 97%   Body Mass Index 32.84 kg/m²   General: normal speech pattern and content   Clothing and grooming normal.  Behavior: no psychomotor abnormalities or impulsivity  Eye contact: good  Affect: normal  Though content: normal insight and reasoning, no evidence of psychotic process.   Neck/Thyroid: No adenopathy, no palpable thyroid nodules.  Lungs: CTAB  Heart: RRR no ectopy  Neuro: Gait normal. Reflexes normal and symmetric. Sensation grossly intact        Abnormal " findings: none    Imaging     None    ASSESMENT AND PLAN     1. Grief  Severe reaction;  -He will increase fluoxetine from 20 mg to 40 mg in 1 week, given referrals  No suicidal ideation  His sister is currently with patient  - REFERRAL TO BEHAVIORAL HEALTH  - REFERRAL TO PSYCHIATRY  2. Situational anxiety  - REFERRAL TO BEHAVIORAL HEALTH  - REFERRAL TO PSYCHIATRY  3. Situational depression  - REFERRAL TO BEHAVIORAL HEALTH  - REFERRAL TO PSYCHIATRY  4. Sleep disorder  - REFERRAL TO BEHAVIORAL HEALTH  - REFERRAL TO PSYCHIATRY  - Reviewed effects and side effects of medication, the patient verbalized understanding     Smoking Counseling: Nonsmoker    Follow up: in 3 months, in 4 weeks if he does not schedule appointment with psychiatry    Please note that this dictation was created using voice recognition software. Despite all efforts, some minor grammar mistakes are possible.

## 2021-02-08 ENCOUNTER — APPOINTMENT (OUTPATIENT)
Dept: MEDICAL GROUP | Age: 51
End: 2021-02-08

## 2021-03-13 DIAGNOSIS — F43.21 SITUATIONAL DEPRESSION: ICD-10-CM

## 2021-03-13 DIAGNOSIS — F41.8 SITUATIONAL ANXIETY: ICD-10-CM

## 2021-03-13 DIAGNOSIS — F43.21 GRIEF: ICD-10-CM

## 2021-03-13 DIAGNOSIS — F51.01 PRIMARY INSOMNIA: ICD-10-CM

## 2021-03-14 RX ORDER — FLUOXETINE 10 MG/1
CAPSULE ORAL
Qty: 180 CAPSULE | Refills: 0 | Status: SHIPPED | OUTPATIENT
Start: 2021-03-14 | End: 2021-06-10

## 2021-06-10 DIAGNOSIS — F43.21 SITUATIONAL DEPRESSION: ICD-10-CM

## 2021-06-10 DIAGNOSIS — F51.01 PRIMARY INSOMNIA: ICD-10-CM

## 2021-06-10 DIAGNOSIS — F43.21 GRIEF: ICD-10-CM

## 2021-06-10 DIAGNOSIS — F41.8 SITUATIONAL ANXIETY: ICD-10-CM

## 2021-06-10 RX ORDER — FLUOXETINE 10 MG/1
CAPSULE ORAL
Qty: 180 CAPSULE | Refills: 0 | Status: SHIPPED | OUTPATIENT
Start: 2021-06-10